# Patient Record
Sex: FEMALE | Race: WHITE | Employment: OTHER | URBAN - METROPOLITAN AREA
[De-identification: names, ages, dates, MRNs, and addresses within clinical notes are randomized per-mention and may not be internally consistent; named-entity substitution may affect disease eponyms.]

---

## 2017-01-16 ENCOUNTER — HOSPITAL ENCOUNTER (OUTPATIENT)
Dept: LAB | Age: 82
Discharge: HOME | End: 2017-01-16
Attending: INTERNAL MEDICINE
Payer: MEDICARE

## 2017-01-16 DIAGNOSIS — I27.20 PULMONARY HYPERTENSION (HCC): ICD-10-CM

## 2017-01-16 DIAGNOSIS — R00.1 BRADYCARDIA: ICD-10-CM

## 2017-01-16 LAB
ALBUMIN SERPL BCP-MCNC: 4.2 G/DL (ref 3.2–4.6)
ALBUMIN/GLOB SERPL: 1.3 {RATIO}
ALP SERPL-CCNC: 61 U/L (ref 50–136)
ALT SERPL-CCNC: 19 U/L (ref 12–65)
ANION GAP BLD CALC-SCNC: 7 MMOL/L
AST SERPL W P-5'-P-CCNC: 25 U/L (ref 15–37)
BASOPHILS # BLD AUTO: 0 K/UL (ref 0–0.2)
BASOPHILS # BLD: 0 % (ref 0–2)
BILIRUB SERPL-MCNC: 0.8 MG/DL (ref 0.2–1.1)
BUN SERPL-MCNC: 29 MG/DL (ref 8–23)
CALCIUM SERPL-MCNC: 10 MG/DL (ref 8.3–10.4)
CHLORIDE SERPL-SCNC: 98 MMOL/L (ref 98–107)
CO2 SERPL-SCNC: 33 MMOL/L (ref 23–32)
CREAT SERPL-MCNC: 1.1 MG/DL (ref 0.6–1)
DIFFERENTIAL METHOD BLD: ABNORMAL
EOSINOPHIL # BLD: 0.1 K/UL (ref 0–0.8)
EOSINOPHIL NFR BLD: 2 % (ref 0.5–7.8)
ERYTHROCYTE [DISTWIDTH] IN BLOOD BY AUTOMATED COUNT: 13.2 % (ref 11.9–14.6)
GLOBULIN SER CALC-MCNC: 3.2 G/DL
GLUCOSE SERPL-MCNC: 110 MG/DL (ref 65–100)
HCT VFR BLD AUTO: 39.4 % (ref 35.8–46.3)
HGB BLD-MCNC: 13.4 G/DL (ref 11.7–15.4)
LYMPHOCYTES # BLD AUTO: 30 % (ref 13–44)
LYMPHOCYTES # BLD: 2.3 K/UL (ref 0.5–4.6)
MAGNESIUM SERPL-MCNC: 2.2 MG/DL (ref 1.8–2.4)
MCH RBC QN AUTO: 30.7 PG (ref 26.1–32.9)
MCHC RBC AUTO-ENTMCNC: 34 G/DL (ref 31.4–35)
MCV RBC AUTO: 90.2 FL (ref 79.6–97.8)
MONOCYTES # BLD: 0.5 K/UL (ref 0.1–1.3)
MONOCYTES NFR BLD AUTO: 7 % (ref 4–12)
NEUTS SEG # BLD: 4.7 K/UL (ref 1.7–8.2)
NEUTS SEG NFR BLD AUTO: 61 % (ref 43–78)
PLATELET # BLD AUTO: 260 K/UL (ref 150–450)
PMV BLD AUTO: 9.9 FL (ref 10.8–14.1)
POTASSIUM SERPL-SCNC: 4.1 MMOL/L (ref 3.5–5.1)
PROT SERPL-MCNC: 7.4 G/DL (ref 6.3–8.2)
RBC # BLD AUTO: 4.37 M/UL (ref 4.05–5.25)
SODIUM SERPL-SCNC: 138 MMOL/L (ref 136–145)
WBC # BLD AUTO: 7.6 K/UL (ref 4.3–11.1)

## 2017-01-16 PROCEDURE — 36415 COLL VENOUS BLD VENIPUNCTURE: CPT | Performed by: INTERNAL MEDICINE

## 2017-01-16 PROCEDURE — 80053 COMPREHEN METABOLIC PANEL: CPT | Performed by: INTERNAL MEDICINE

## 2017-01-16 PROCEDURE — 85025 COMPLETE CBC W/AUTO DIFF WBC: CPT | Performed by: INTERNAL MEDICINE

## 2017-01-16 PROCEDURE — 83735 ASSAY OF MAGNESIUM: CPT | Performed by: INTERNAL MEDICINE

## 2017-01-18 ENCOUNTER — ANESTHESIA EVENT (OUTPATIENT)
Dept: SURGERY | Age: 82
End: 2017-01-18
Payer: MEDICARE

## 2017-01-18 NOTE — PROGRESS NOTES
Patient pre-assessment complete for Pacemaker with Dr Galvez Going scheduled for 17 at 1:00am, arrival time 11am. Patient verified using . Patient instructed to bring all home medications in labeled bottles on the day of procedure. NPO status reinforced. Patient instructed to HOLD HCTZ. Instructed they can take all other medications excluding vitamins & supplements. Patient verbalizes understanding of all instructions & denies any questions at this time.

## 2017-01-19 ENCOUNTER — HOSPITAL ENCOUNTER (OUTPATIENT)
Age: 82
Setting detail: OBSERVATION
Discharge: HOME | End: 2017-01-20
Attending: INTERNAL MEDICINE | Admitting: INTERNAL MEDICINE
Payer: MEDICARE

## 2017-01-19 ENCOUNTER — SURGERY (OUTPATIENT)
Age: 82
End: 2017-01-19

## 2017-01-19 ENCOUNTER — APPOINTMENT (OUTPATIENT)
Dept: CARDIAC CATH/INVASIVE PROCEDURES | Age: 82
End: 2017-01-19
Payer: MEDICARE

## 2017-01-19 ENCOUNTER — ANESTHESIA (OUTPATIENT)
Dept: SURGERY | Age: 82
End: 2017-01-19
Payer: MEDICARE

## 2017-01-19 DIAGNOSIS — R00.1 BRADYCARDIA: Primary | ICD-10-CM

## 2017-01-19 PROBLEM — Z95.0 PACEMAKER: Status: ACTIVE | Noted: 2017-01-19

## 2017-01-19 LAB
ANION GAP BLD CALC-SCNC: 7 MMOL/L (ref 7–16)
ATRIAL RATE: 50 BPM
BUN SERPL-MCNC: 23 MG/DL (ref 8–23)
CALCIUM SERPL-MCNC: 9.3 MG/DL (ref 8.3–10.4)
CALCULATED P AXIS, ECG09: 55 DEGREES
CALCULATED R AXIS, ECG10: -31 DEGREES
CALCULATED T AXIS, ECG11: 72 DEGREES
CHLORIDE SERPL-SCNC: 101 MMOL/L (ref 98–107)
CO2 SERPL-SCNC: 32 MMOL/L (ref 21–32)
CREAT SERPL-MCNC: 1.21 MG/DL (ref 0.6–1)
DIAGNOSIS, 93000: NORMAL
DIASTOLIC BP, ECG02: NORMAL MMHG
ERYTHROCYTE [DISTWIDTH] IN BLOOD BY AUTOMATED COUNT: 13.4 % (ref 11.9–14.6)
GLUCOSE SERPL-MCNC: 97 MG/DL (ref 65–100)
HCT VFR BLD AUTO: 38.3 % (ref 35.8–46.3)
HGB BLD-MCNC: 12.9 G/DL (ref 11.7–15.4)
INR PPP: 1 (ref 0.9–1.2)
MAGNESIUM SERPL-MCNC: 2.2 MG/DL (ref 1.8–2.4)
MCH RBC QN AUTO: 30.1 PG (ref 26.1–32.9)
MCHC RBC AUTO-ENTMCNC: 33.7 G/DL (ref 31.4–35)
MCV RBC AUTO: 89.3 FL (ref 79.6–97.8)
P-R INTERVAL, ECG05: 172 MS
PLATELET # BLD AUTO: 254 K/UL (ref 150–450)
PMV BLD AUTO: 10.5 FL (ref 10.8–14.1)
POTASSIUM SERPL-SCNC: 3.3 MMOL/L (ref 3.5–5.1)
PROTHROMBIN TIME: 10.7 SEC (ref 9.6–12)
Q-T INTERVAL, ECG07: 472 MS
QRS DURATION, ECG06: 82 MS
QTC CALCULATION (BEZET), ECG08: 479 MS
RBC # BLD AUTO: 4.29 M/UL (ref 4.05–5.25)
SODIUM SERPL-SCNC: 140 MMOL/L (ref 136–145)
SYSTOLIC BP, ECG01: NORMAL MMHG
VENTRICULAR RATE, ECG03: 62 BPM
WBC # BLD AUTO: 6.3 K/UL (ref 4.3–11.1)

## 2017-01-19 PROCEDURE — 99218 HC RM OBSERVATION: CPT

## 2017-01-19 PROCEDURE — 74011250636 HC RX REV CODE- 250/636

## 2017-01-19 PROCEDURE — 76060000033 HC ANESTHESIA 1 TO 1.5 HR: Performed by: INTERNAL MEDICINE

## 2017-01-19 PROCEDURE — 85610 PROTHROMBIN TIME: CPT | Performed by: INTERNAL MEDICINE

## 2017-01-19 PROCEDURE — 85027 COMPLETE CBC AUTOMATED: CPT | Performed by: INTERNAL MEDICINE

## 2017-01-19 PROCEDURE — 74011000250 HC RX REV CODE- 250

## 2017-01-19 PROCEDURE — C1898 LEAD, PMKR, OTHER THAN TRANS: HCPCS

## 2017-01-19 PROCEDURE — C1892 INTRO/SHEATH,FIXED,PEEL-AWAY: HCPCS

## 2017-01-19 PROCEDURE — 77030008467 HC STPLR SKN COVD -B

## 2017-01-19 PROCEDURE — 77030031139 HC SUT VCRL2 J&J -A

## 2017-01-19 PROCEDURE — 74011000272 HC RX REV CODE- 272: Performed by: INTERNAL MEDICINE

## 2017-01-19 PROCEDURE — 74011250636 HC RX REV CODE- 250/636: Performed by: INTERNAL MEDICINE

## 2017-01-19 PROCEDURE — L3670 SO ACRO/CLAV CAN WEB PRE OTS: HCPCS

## 2017-01-19 PROCEDURE — 77030018579 HC SUT TICRN1 COVD -B

## 2017-01-19 PROCEDURE — 83735 ASSAY OF MAGNESIUM: CPT | Performed by: INTERNAL MEDICINE

## 2017-01-19 PROCEDURE — 33208 INSRT HEART PM ATRIAL & VENT: CPT

## 2017-01-19 PROCEDURE — 93005 ELECTROCARDIOGRAM TRACING: CPT | Performed by: INTERNAL MEDICINE

## 2017-01-19 PROCEDURE — C1785 PMKR, DUAL, RATE-RESP: HCPCS

## 2017-01-19 PROCEDURE — 74011250637 HC RX REV CODE- 250/637: Performed by: INTERNAL MEDICINE

## 2017-01-19 PROCEDURE — 80048 BASIC METABOLIC PNL TOTAL CA: CPT | Performed by: INTERNAL MEDICINE

## 2017-01-19 PROCEDURE — 74011000250 HC RX REV CODE- 250: Performed by: INTERNAL MEDICINE

## 2017-01-19 PROCEDURE — 77030012935 HC DRSG AQUACEL BMS -B

## 2017-01-19 RX ORDER — LORAZEPAM 0.5 MG/1
0.5 TABLET ORAL
Status: DISCONTINUED | OUTPATIENT
Start: 2017-01-19 | End: 2017-01-20 | Stop reason: HOSPADM

## 2017-01-19 RX ORDER — POTASSIUM CHLORIDE 14.9 MG/ML
20 INJECTION INTRAVENOUS ONCE
Status: COMPLETED | OUTPATIENT
Start: 2017-01-19 | End: 2017-01-19

## 2017-01-19 RX ORDER — SODIUM CHLORIDE, SODIUM LACTATE, POTASSIUM CHLORIDE, CALCIUM CHLORIDE 600; 310; 30; 20 MG/100ML; MG/100ML; MG/100ML; MG/100ML
100 INJECTION, SOLUTION INTRAVENOUS CONTINUOUS
Status: DISCONTINUED | OUTPATIENT
Start: 2017-01-19 | End: 2017-01-19

## 2017-01-19 RX ORDER — AMLODIPINE BESYLATE 5 MG/1
5 TABLET ORAL DAILY
COMMUNITY
End: 2017-01-20

## 2017-01-19 RX ORDER — DIPHENHYDRAMINE HYDROCHLORIDE 50 MG/ML
12.5 INJECTION, SOLUTION INTRAMUSCULAR; INTRAVENOUS ONCE
Status: DISCONTINUED | OUTPATIENT
Start: 2017-01-19 | End: 2017-01-19

## 2017-01-19 RX ORDER — CEPHALEXIN 500 MG/1
500 CAPSULE ORAL 3 TIMES DAILY
Status: DISCONTINUED | OUTPATIENT
Start: 2017-01-19 | End: 2017-01-20 | Stop reason: HOSPADM

## 2017-01-19 RX ORDER — DIPHENHYDRAMINE HYDROCHLORIDE 50 MG/ML
50 INJECTION, SOLUTION INTRAMUSCULAR; INTRAVENOUS ONCE
Status: COMPLETED | OUTPATIENT
Start: 2017-01-19 | End: 2017-01-19

## 2017-01-19 RX ORDER — BUPIVACAINE HYDROCHLORIDE AND EPINEPHRINE 5; 5 MG/ML; UG/ML
60 INJECTION, SOLUTION EPIDURAL; INTRACAUDAL; PERINEURAL ONCE
Status: COMPLETED | OUTPATIENT
Start: 2017-01-19 | End: 2017-01-19

## 2017-01-19 RX ORDER — SODIUM CHLORIDE 0.9 % (FLUSH) 0.9 %
5-10 SYRINGE (ML) INJECTION AS NEEDED
Status: DISCONTINUED | OUTPATIENT
Start: 2017-01-19 | End: 2017-01-20 | Stop reason: ALTCHOICE

## 2017-01-19 RX ORDER — OXYCODONE AND ACETAMINOPHEN 5; 325 MG/1; MG/1
1 TABLET ORAL
Status: DISCONTINUED | OUTPATIENT
Start: 2017-01-19 | End: 2017-01-20 | Stop reason: HOSPADM

## 2017-01-19 RX ORDER — CARVEDILOL 3.12 MG/1
3.12 TABLET ORAL 2 TIMES DAILY WITH MEALS
COMMUNITY
End: 2017-01-20

## 2017-01-19 RX ORDER — SODIUM CHLORIDE 0.9 % (FLUSH) 0.9 %
5-10 SYRINGE (ML) INJECTION EVERY 8 HOURS
Status: DISCONTINUED | OUTPATIENT
Start: 2017-01-19 | End: 2017-01-19

## 2017-01-19 RX ORDER — OXYCODONE AND ACETAMINOPHEN 5; 325 MG/1; MG/1
1 TABLET ORAL AS NEEDED
Status: DISCONTINUED | OUTPATIENT
Start: 2017-01-19 | End: 2017-01-20 | Stop reason: ALTCHOICE

## 2017-01-19 RX ORDER — SODIUM CHLORIDE 9 MG/ML
50 INJECTION, SOLUTION INTRAVENOUS CONTINUOUS
Status: DISCONTINUED | OUTPATIENT
Start: 2017-01-19 | End: 2017-01-19

## 2017-01-19 RX ORDER — MIDAZOLAM HYDROCHLORIDE 1 MG/ML
2 INJECTION, SOLUTION INTRAMUSCULAR; INTRAVENOUS
Status: DISCONTINUED | OUTPATIENT
Start: 2017-01-19 | End: 2017-01-20 | Stop reason: ALTCHOICE

## 2017-01-19 RX ORDER — MIDAZOLAM HYDROCHLORIDE 1 MG/ML
2 INJECTION, SOLUTION INTRAMUSCULAR; INTRAVENOUS ONCE
Status: DISCONTINUED | OUTPATIENT
Start: 2017-01-19 | End: 2017-01-19

## 2017-01-19 RX ORDER — HYDROCHLOROTHIAZIDE 50 MG/1
50 TABLET ORAL DAILY
Status: DISCONTINUED | OUTPATIENT
Start: 2017-01-20 | End: 2017-01-20 | Stop reason: HOSPADM

## 2017-01-19 RX ORDER — SODIUM CHLORIDE 0.9 % (FLUSH) 0.9 %
5-10 SYRINGE (ML) INJECTION EVERY 8 HOURS
Status: DISCONTINUED | OUTPATIENT
Start: 2017-01-19 | End: 2017-01-20 | Stop reason: HOSPADM

## 2017-01-19 RX ORDER — SODIUM CHLORIDE, SODIUM LACTATE, POTASSIUM CHLORIDE, CALCIUM CHLORIDE 600; 310; 30; 20 MG/100ML; MG/100ML; MG/100ML; MG/100ML
INJECTION, SOLUTION INTRAVENOUS
Status: DISCONTINUED | OUTPATIENT
Start: 2017-01-19 | End: 2017-01-19 | Stop reason: HOSPADM

## 2017-01-19 RX ORDER — CEFAZOLIN SODIUM IN 0.9 % NACL 2 G/50 ML
INTRAVENOUS SOLUTION, PIGGYBACK (ML) INTRAVENOUS AS NEEDED
Status: DISCONTINUED | OUTPATIENT
Start: 2017-01-19 | End: 2017-01-19 | Stop reason: HOSPADM

## 2017-01-19 RX ORDER — LANOLIN ALCOHOL/MO/W.PET/CERES
400 CREAM (GRAM) TOPICAL DAILY
Status: DISCONTINUED | OUTPATIENT
Start: 2017-01-20 | End: 2017-01-20 | Stop reason: HOSPADM

## 2017-01-19 RX ORDER — METOPROLOL TARTRATE 50 MG/1
50 TABLET ORAL 2 TIMES DAILY
Status: DISCONTINUED | OUTPATIENT
Start: 2017-01-19 | End: 2017-01-20 | Stop reason: HOSPADM

## 2017-01-19 RX ORDER — AMIODARONE HYDROCHLORIDE 200 MG/1
200 TABLET ORAL 2 TIMES DAILY
Status: DISCONTINUED | OUTPATIENT
Start: 2017-01-19 | End: 2017-01-20 | Stop reason: HOSPADM

## 2017-01-19 RX ORDER — PROPOFOL 10 MG/ML
INJECTION, EMULSION INTRAVENOUS
Status: DISCONTINUED | OUTPATIENT
Start: 2017-01-19 | End: 2017-01-19 | Stop reason: HOSPADM

## 2017-01-19 RX ORDER — FAMOTIDINE 20 MG/1
20 TABLET, FILM COATED ORAL ONCE
Status: DISCONTINUED | OUTPATIENT
Start: 2017-01-19 | End: 2017-01-19

## 2017-01-19 RX ORDER — PROPOFOL 10 MG/ML
INJECTION, EMULSION INTRAVENOUS AS NEEDED
Status: DISCONTINUED | OUTPATIENT
Start: 2017-01-19 | End: 2017-01-19 | Stop reason: HOSPADM

## 2017-01-19 RX ORDER — HYDROMORPHONE HYDROCHLORIDE 2 MG/ML
0.5 INJECTION, SOLUTION INTRAMUSCULAR; INTRAVENOUS; SUBCUTANEOUS
Status: DISCONTINUED | OUTPATIENT
Start: 2017-01-19 | End: 2017-01-20 | Stop reason: ALTCHOICE

## 2017-01-19 RX ORDER — FAMOTIDINE 10 MG/ML
20 INJECTION INTRAVENOUS ONCE
Status: COMPLETED | OUTPATIENT
Start: 2017-01-19 | End: 2017-01-19

## 2017-01-19 RX ORDER — LEVOTHYROXINE SODIUM 50 UG/1
50 TABLET ORAL
Status: DISCONTINUED | OUTPATIENT
Start: 2017-01-20 | End: 2017-01-20 | Stop reason: HOSPADM

## 2017-01-19 RX ORDER — LIDOCAINE HYDROCHLORIDE 20 MG/ML
INJECTION, SOLUTION EPIDURAL; INFILTRATION; INTRACAUDAL; PERINEURAL AS NEEDED
Status: DISCONTINUED | OUTPATIENT
Start: 2017-01-19 | End: 2017-01-19 | Stop reason: HOSPADM

## 2017-01-19 RX ORDER — AMIODARONE HYDROCHLORIDE 150 MG/3ML
INJECTION, SOLUTION INTRAVENOUS AS NEEDED
Status: DISCONTINUED | OUTPATIENT
Start: 2017-01-19 | End: 2017-01-19 | Stop reason: HOSPADM

## 2017-01-19 RX ORDER — SODIUM CHLORIDE 0.9 % (FLUSH) 0.9 %
5-10 SYRINGE (ML) INJECTION AS NEEDED
Status: DISCONTINUED | OUTPATIENT
Start: 2017-01-19 | End: 2017-01-20 | Stop reason: HOSPADM

## 2017-01-19 RX ORDER — FENTANYL CITRATE 50 UG/ML
25 INJECTION, SOLUTION INTRAMUSCULAR; INTRAVENOUS ONCE
Status: DISCONTINUED | OUTPATIENT
Start: 2017-01-19 | End: 2017-01-19

## 2017-01-19 RX ORDER — OXYCODONE HYDROCHLORIDE 5 MG/1
5 TABLET ORAL
Status: DISCONTINUED | OUTPATIENT
Start: 2017-01-19 | End: 2017-01-20 | Stop reason: ALTCHOICE

## 2017-01-19 RX ORDER — LIDOCAINE HYDROCHLORIDE 10 MG/ML
0.1 INJECTION INFILTRATION; PERINEURAL AS NEEDED
Status: DISCONTINUED | OUTPATIENT
Start: 2017-01-19 | End: 2017-01-20 | Stop reason: ALTCHOICE

## 2017-01-19 RX ADMIN — AMIODARONE HYDROCHLORIDE 25 MG: 150 INJECTION, SOLUTION INTRAVENOUS at 16:33

## 2017-01-19 RX ADMIN — BUPIVACAINE HYDROCHLORIDE AND EPINEPHRINE 300 MG: 5; 5 INJECTION, SOLUTION EPIDURAL; INTRACAUDAL; PERINEURAL at 13:00

## 2017-01-19 RX ADMIN — DIPHENHYDRAMINE HYDROCHLORIDE 50 MG: 50 INJECTION, SOLUTION INTRAMUSCULAR; INTRAVENOUS at 15:20

## 2017-01-19 RX ADMIN — PROPOFOL 120 MCG/KG/MIN: 10 INJECTION, EMULSION INTRAVENOUS at 16:05

## 2017-01-19 RX ADMIN — Medication 5 ML: at 21:37

## 2017-01-19 RX ADMIN — SODIUM CHLORIDE, SODIUM LACTATE, POTASSIUM CHLORIDE, CALCIUM CHLORIDE: 600; 310; 30; 20 INJECTION, SOLUTION INTRAVENOUS at 15:57

## 2017-01-19 RX ADMIN — FAMOTIDINE 20 MG: 10 INJECTION, SOLUTION INTRAVENOUS at 15:20

## 2017-01-19 RX ADMIN — LIDOCAINE HYDROCHLORIDE 40 MG: 20 INJECTION, SOLUTION EPIDURAL; INFILTRATION; INTRACAUDAL; PERINEURAL at 16:03

## 2017-01-19 RX ADMIN — POTASSIUM CHLORIDE 20 MEQ: 14.9 INJECTION, SOLUTION INTRAVENOUS at 15:10

## 2017-01-19 RX ADMIN — PROPOFOL 40 MG: 10 INJECTION, EMULSION INTRAVENOUS at 16:03

## 2017-01-19 RX ADMIN — AMIODARONE HYDROCHLORIDE 25 MG: 150 INJECTION, SOLUTION INTRAVENOUS at 16:40

## 2017-01-19 RX ADMIN — AMIODARONE HYDROCHLORIDE 25 MG: 150 INJECTION, SOLUTION INTRAVENOUS at 16:39

## 2017-01-19 RX ADMIN — AMIODARONE HYDROCHLORIDE 200 MG: 200 TABLET ORAL at 20:49

## 2017-01-19 RX ADMIN — AMIODARONE HYDROCHLORIDE 25 MG: 150 INJECTION, SOLUTION INTRAVENOUS at 16:35

## 2017-01-19 RX ADMIN — CEPHALEXIN 500 MG: 500 CAPSULE ORAL at 21:36

## 2017-01-19 RX ADMIN — Medication 2 G: at 15:57

## 2017-01-19 RX ADMIN — AMIODARONE HYDROCHLORIDE 25 MG: 150 INJECTION, SOLUTION INTRAVENOUS at 16:34

## 2017-01-19 RX ADMIN — METOPROLOL TARTRATE 50 MG: 50 TABLET ORAL at 20:49

## 2017-01-19 RX ADMIN — NEOMYCIN AND POLYMYXIN B SULFATES: 40; 200000 IRRIGANT IRRIGATION at 13:00

## 2017-01-19 RX ADMIN — AMIODARONE HYDROCHLORIDE 25 MG: 150 INJECTION, SOLUTION INTRAVENOUS at 16:41

## 2017-01-19 NOTE — ANESTHESIA POSTPROCEDURE EVALUATION
Post-Anesthesia Evaluation and Assessment    Patient: Thania Brownlee MRN: [id number]  SSN: [id number]    YOB: 1926  Age: 80 y.o. Sex: female       Cardiovascular Function/Vital Signs  Visit Vitals    /69    Pulse 82    Temp 36.4 °C (97.5 °F)    Resp 14    Ht 5' 4.5\" (1.638 m)    Wt 52.2 kg (115 lb)    SpO2 98%    Breastfeeding No    BMI 19.43 kg/m2       Patient is status post total IV anesthesia anesthesia for Procedure(s):  DUAL CHAMBER PACEMAKER. Nausea/Vomiting: None    Postoperative hydration reviewed and adequate. Pain:  Pain Scale 1: Numeric (0 - 10) (01/19/17 1735)  Pain Intensity 1: 0 (01/19/17 1735)   Managed    Neurological Status: At baseline    Mental Status and Level of Consciousness: Arousable    Pulmonary Status:   O2 Device: Room air (01/19/17 1710)   Adequate oxygenation and airway patent    Complications related to anesthesia: None    Post-anesthesia assessment completed.  No concerns    Signed By: Rakel Quintero MD     January 19, 2017

## 2017-01-19 NOTE — IP AVS SNAPSHOT
Onofre Victoria 
 
 
 [address] [facility] [address] 
[phone] Patient: Yris Moore MRN: [id number] :3/11/1926 You are allergic to the following Allergen Reactions Demerol (Meperidine) Nausea and Vomiting Morphine Sulfate Nausea and Vomiting  
    
 Sulfa (Sulfonamide Antibiotics) Rash Darvon (Propoxyphene) Unknown (comments) Unknown reaction Pcn (Penicillins) Rash Recent Documentation Height Weight Breastfeeding? 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 [address] [place] [address] 
[phone] Jose De Jesus Keating MD On 2/3/2017 Friday, Feb 3rd at 10:30am-- [place] office [address] [facility] [address] 
[phone] Your Appointments  2017 10:30 AM EST  
OFFICE DEVICE CHECKS with [employer] DEVICE 39  
 [facility] ([facility]) [address] 
[address] [place] 81  
[phone] Current Discharge Medication List  
  
START taking these medications Dose & Instructions Dispensing Information Comments Morning Noon Evening Bedtime  
 amiodarone 200 mg tablet Commonly known as:  CORDARONE Your next dose is:  Tomorrow Dose:  200 mg Take 1 Tab by mouth daily. Quantity:  30 Tab Refills:  11  
     
  
   
   
   
  
 cephALEXin 500 mg capsule Commonly known as:  Nash Panning Your next dose is: Today Dose:  500 mg Take 1 Cap by mouth three (3) times daily. Quantity:  10 Cap Refills:  0  
     
  
   
  
   
   
  
  
 metoprolol tartrate 50 mg tablet Commonly known as:  LOPRESSOR Your next dose is: Today Dose:  50 mg Take 1 Tab by mouth two (2) times a day. Quantity:  60 Tab Refills:  11  
     
  
   
   
   
  
  
 oxyCODONE-acetaminophen 5-325 mg per tablet Commonly known as:  PERCOCET Notes to Patient:  AS NEEDED Dose:  1 Tab Take 1 Tab by mouth every four (4) hours as needed. Max Daily Amount: 6 Tabs. Quantity:  10 Tab Refills:  0  
     
   
   
   
  
 potassium chloride SR 10 mEq tablet Commonly known as:  KLOR-CON 10 Your next dose is:  Tomorrow Dose:  10 mEq Take 1 Tab by mouth daily. Quantity:  30 Tab Refills:  11 CONTINUE these medications which have NOT CHANGED Dose & Instructions Dispensing Information Comments Morning Noon Evening Bedtime  
 cholecalciferol 1,000 unit tablet Commonly known as:  VITAMIN D3 Your next dose is:  Tomorrow Take  by mouth daily. Refills:  0  
     
  
   
   
   
  
 hydroCHLOROthiazide 50 mg tablet Commonly known as:  HYDRODIURIL Your next dose is:  Tomorrow 1 po qd Quantity:  90 Tab Refills:  1  
     
  
   
   
   
  
 levothyroxine 50 mcg tablet Commonly known as:  SYNTHROID Your next dose is:  Tomorrow 1 po qd Quantity:  90 Tab Refills:  3 LORazepam 0.5 mg tablet Commonly known as:  ATIVAN Notes to Patient:  AS NEEDED  
   
 1 po every day prn Quantity:  90 Tab Refills:  1  
     
   
   
   
  
 magnesium oxide 400 mg tablet Commonly known as:  MAG-OX Your next dose is:  Tomorrow Dose:  400 mg Take 400 mg by mouth daily. Refills:  0  
     
  
   
   
   
  
 neomycin-polymyxin-hydrocortisone (buffered) 3.5-10,000-1 mg/mL-unit/mL-% otic suspension Commonly known as:  Lodema Eaton Your next dose is: Today Dose:  3 Drop Administer 3 Drops into each ear four (4) times daily. Quantity:  10 mL Refills:  11  
     
  
   
  
   
  
   
  
  
 triamcinolone acetonide 0.1 % topical cream  
Commonly known as:  KENALOG Notes to Patient:  AS DIRECTED  
   
 as directed. Refills:  0 STOP taking these medications   
 amLODIPine 5 mg tablet Commonly known as:  NORVASC  
   
  
 carvedilol 3.125 mg tablet Commonly known as:  COREG  
   
  
 COREG 3.125 mg tablet Generic drug:  carvedilol Where to Get Your Medications Information on where to get these meds will be given to you by the nurse or doctor. ! Ask your nurse or doctor about these medications  
  amiodarone 200 mg tablet  
 cephALEXin 500 mg capsule  
 metoprolol tartrate 50 mg tablet  
 oxyCODONE-acetaminophen 5-325 mg per tablet  
 potassium chloride SR 10 mEq tablet Discharge Instructions Pacemaker Discharge Instructions *Check the incision site frequently for swelling or bleeding. If you see any bleeding, lie down and apply pressure over the area with a clean town or washcloth. Notify your doctor for any redness, swelling, drainage or oozing from the puncture site. Notify your doctor for any fever or chills. *Any signs of infection including increased redness, suspicious drainage, or unexplained fever should be reported to the doctor immediately by calling [address] [place] [address] [address] [facility]. *Until cleared by the MD-- No raising the affected limb above 90 degrees (above the shoulders). No lifting with that arm. No driving. *You should sleep in the sling. *You may resume your usual diet. Drink more fluids than usual. 
 
*Have a responsible person drive you home and stay with you for at least 24 hours after your procedure. *Leave the dressing to shoulder in place. The MD will remove it at your follow-up appointment. You may shower in 2 days (Sunday). Until cleared by MD, no tub baths, hot tubs or swimming. Pacemaker Placement: What to Expect at [facility] Your Recovery Pacemaker placement is surgery to put a pacemaker in your chest when you have bradycardia (a slow heart rate). A pacemaker is a small, battery-powered device that sends electrical signals to the heart to keep the heartbeat steady. Thin wires, called leads, carry the signals from the pacemaker to the heart. A pacemaker can prevent or reduce dizziness, fainting, and shortness of breath caused by a slow or unsteady heartbeat. Your chest may be sore where the doctor made the cut (incision) and put in the pacemaker. You also may have a bruise and mild swelling. These symptoms usually get better in 1 to 2 weeks. You may feel a hard ridge along the incision. This usually gets softer in the months after surgery. You may be able to see or feel the outline of the pacemaker under your skin. You will probably be able to go back to work or your usual routine 1 to 2 weeks after surgery. Pacemaker batteries usually last 5 to 15 years. Your doctor will talk to you about how often you will need to have your pacemaker checked. You can safely use most household and office electronics such as kitchen appliances, electric power tools, and computers.  You will need to stay away from things with strong magnetic and electrical fields such as an MRI machine (unless your pacemaker is safe for an MRI), welding equipment, and power generators. You can use a cell phone, but keep it at least 6 inches away from your pacemaker. Check with your doctor about what you need to stay away from, what you need to use with care, and what is okay to use. This care sheet gives you a general idea about how long it will take for you to recover. But each person recovers at a different pace. Follow the steps below to get better as quickly as possible. How can you care for yourself at home? Activity · Rest when you feel tired. · Be active. Walking is a good choice. · For 4 to 6 weeks: ¨ Avoid activities that strain your chest or upper arm muscles. This includes pushing a  or vacuum, mopping floors, swimming, or swinging a golf club or tennis racquet. ¨ Do not raise your arm (the one on the side of your body where the pacemaker is located) above your shoulder. ¨ Allow your body to heal. Don't move quickly or lift anything heavy until you are feeling better. · Many people are able to return to work within 1 to 2 weeks after surgery. · Ask your doctor when it is okay for you to have sex. Diet · You can eat your normal diet. If your stomach is upset, try bland, low-fat foods like plain rice, broiled chicken, toast, and yogurt. Medicines · Your doctor will tell you if and when you can restart your medicines. He or she will also give you instructions about taking any new medicines. · If you take aspirin or some other blood thinner, be sure to talk to your doctor. He or she will tell you if and when to start taking this medicine again. Make sure that you understand exactly what your doctor wants you to do. · Be safe with medicines. Read and follow all instructions on the label. ¨ If the doctor gave you a prescription medicine for pain, take it as prescribed. ¨ If you are not taking a prescription pain medicine, ask your doctor if you can take an over-the-counter medicine. ¨ Do not take aspirin, ibuprofen (Advil, Motrin), naproxen (Aleve), or other nonsteroidal anti-inflammatory drugs (NSAIDs) unless your doctor says it is okay. · If your doctor prescribed antibiotics, take them as directed. Do not stop taking them just because you feel better. You need to take the full course of antibiotics. Incision care · If you have strips of tape on the incision, leave the tape on for a week or until it falls off. · Keep the incision dry while it heals. Your doctor may recommend sponge baths for about 7 days, but do not get the incision wet. Your doctor will let you know when you may take showers. After a shower, pat the incision dry. · Don't use hydrogen peroxide or alcohol on the incision, which can slow healing. You may cover the area with a gauze bandage if it oozes fluid or rubs against clothing. Change the bandage every day. · Do not take a bath or get into a hot tub for the first 2 weeks, or until your doctor tells you it is okay. Other instructions · Keep a medical ID card with you at all times that says you have a pacemaker. The card should include the  and model information. · Wear medical alert jewelry stating that you have a pacemaker. You can buy this at most drugstores. · Check your pulse as directed by your doctor. · Have your pacemaker checked as often as your doctor recommends. In some cases, this may be done over the phone or the Internet. Your doctor will give you instructions about how to do this. Follow-up care is a key part of your treatment and safety. Be sure to make and go to all appointments, and call your doctor if you are having problems. It's also a good idea to know your test results and keep a list of the medicines you take. When should you call for help? Call 911 anytime you think you may need emergency care. For example, call if: 
· You passed out (lost consciousness). · You have severe trouble breathing. · You have sudden chest pain and shortness of breath, or you cough up blood. · You have symptoms of a heart attack. These may include: ¨ Chest pain or pressure, or a strange feeling in the chest. 
¨ Sweating. ¨ Shortness of breath. ¨ Nausea or vomiting. ¨ Pain, pressure, or a strange feeling in the back, neck, jaw, or upper belly or in one or both shoulders or arms. ¨ Lightheadedness or sudden weakness. ¨ A fast or irregular heartbeat. After you call 911, the  may tell you to chew 1 adult-strength or 2 to 4 low-dose aspirin. Wait for an ambulance. Do not try to drive yourself. · You have symptoms of a stroke. These may include: 
¨ Sudden numbness, tingling, weakness, or loss of movement in your face, arm, or leg, especially on only one side of your body. ¨ Sudden vision changes. ¨ Sudden trouble speaking. ¨ Sudden confusion or trouble understanding simple statements. ¨ Sudden problems with walking or balance. ¨ A sudden, severe headache that is different from past headaches. Call your doctor now or seek immediate medical care if: 
· Your heartbeat feels very fast or slow, skips beats, or flutters. · You are dizzy or lightheaded, or you feel like you may faint. · You have new or increased shortness of breath. · You have pain that does not get better after you take pain medicine. · You have loose stitches, or your incision comes open. · Bright red blood has soaked through the bandage over your incision. · You have signs of infection, such as: 
¨ Increased pain, swelling, warmth, or redness. ¨ Red streaks leading from the incision. ¨ Pus draining from the incision. ¨ A fever. · You have signs of a blood clot, such as: 
¨ Pain in your calf, back of the knee, thigh, or groin. ¨ Redness and swelling in your leg or groin. Watch closely for changes in your health, and be sure to contact your doctor if: 
· You have any problems with your pacemaker. Where can you learn more? Go to http://maura-zhneg.info/. Enter G550 in the search box to learn more about \"Pacemaker Placement: What to Expect at Home. \" Current as of: August 4, 2016 Content Version: 11.1 © 5287-1088 [employer]. Care instructions adapted under license by [employer] (which disclaims liability or warranty for this information). If you have questions about a medical condition or this instruction, always ask your healthcare professional. [place] [address] any warranty or liability for your use of this information. Learning About Rhythm-Control Medicines Introduction Rhythm-control medicines return your heart to a normal rhythm. And they keep it at a normal rhythm. They are also called antiarrhythmics. Doctors may use these medicines if: 
· You have bad symptoms. · You had electric cardioversion. Or you will have it. · You tried medicine to control your heart rate. But it did not help. These medicines can have serious side effects. Examples · Amiodarone (Cordarone, Pacerone) · Dofetilide (Tikosyn) · Propafenone (Rythmol) · Sotalol (Betapace AF) Possible side effects Side effects depend on which medicine you take. One serious one is a very irregular heart rate. Read the information that comes with your medicine. What to know about taking this medicine · You may be in the hospital when you start this medicine. Your doctor will watch what it does to your heart. · Be sure to ask your doctor any questions. You may want to know more about the pros and cons of the medicine. · Your doctor will check you often. He or she will make sure you are not having problems. Be sure to go to all of your visits. · You may need regular blood tests. These make sure you are taking the right amount of medicine. · Take your medicines exactly as prescribed. Call your doctor if you think you are having a problem with your medicine. · Check with your doctor or pharmacist before you use any other medicines. This includes over-the-counter medicines. Make sure your doctor knows all of the medicines, vitamins, herbal products, and supplements you take. Taking some medicines together can cause problems. Where can you learn more? Go to http://maura-zheng.info/. Enter X379 in the search box to learn more about \"Learning About Rhythm-Control Medicines. \" Current as of: January 27, 2016 Content Version: 11.1 © 2944-9266 [employer]at. Care instructions adapted under license by [employer] (which disclaims liability or warranty for this information). If you have questions about a medical condition or this instruction, always ask your healthcare professional. [address] any warranty or liability for your use of this information. Heart-Healthy Diet: Care Instructions Your Care Instructions A heart-healthy diet has lots of vegetables, fruits, nuts, beans, and whole grains, and is low in salt. It limits foods that are high in saturated fat, such as meats, cheeses, and fried foods. It may be hard to change your diet, but even small changes can lower your risk of heart attack and heart disease. Follow-up care is a key part of your treatment and safety. Be sure to make and go to all appointments, and call your doctor if you are having problems. It's also a good idea to know your test results and keep a list of the medicines you take. How can you care for yourself at home? Watch your portions · Learn what a serving is. A \"serving\" and a \"portion\" are not always the same thing. Make sure that you are not eating larger portions than are recommended. For example, a serving of pasta is ½ cup.  A serving size of meat is 2 to 3 ounces. A 3-ounce serving is about the size of a deck of cards. Measure serving sizes until you are good at [place]" them. Keep in mind that restaurants often serve portions that are 2 or 3 times the size of one serving. · To keep your energy level up and keep you from feeling hungry, eat often but in smaller portions. · Eat only the number of calories you need to stay at a healthy weight. If you need to lose weight, eat fewer calories than your body burns (through exercise and other physical activity). Eat more fruits and vegetables · Eat a variety of fruit and vegetables every day. Dark green, deep orange, red, or yellow fruits and vegetables are especially good for you. Examples include spinach, carrots, peaches, and berries. · Keep carrots, celery, and other veggies handy for snacks. Buy fruit that is in season and store it where you can see it so that you will be tempted to eat it. · Cook dishes that have a lot of veggies in them, such as stir-fries and soups. Limit saturated and trans fat · Read food labels, and try to avoid saturated and trans fats. They increase your risk of heart disease. Trans fat is found in many processed foods such as cookies and crackers. · Use olive or canola oil when you cook. Try cholesterol-lowering spreads, such as Benecol or Take Control. · Bake, broil, grill, or steam foods instead of frying them. · Choose lean meats instead of high-fat meats such as hot dogs and sausages. Cut off all visible fat when you prepare meat. · Eat fish, skinless poultry, and meat alternatives such as soy products instead of high-fat meats. Soy products, such as tofu, may be especially good for your heart. · Choose low-fat or fat-free milk and dairy products. Eat fish · Eat at least two servings of fish a week. Certain fish, such as salmon and tuna, contain omega-3 fatty acids, which may help reduce your risk of heart attack. Eat foods high in fiber · Eat a variety of grain products every day. Include whole-grain foods that have lots of fiber and nutrients. Examples of whole-grain foods include oats, whole wheat bread, and brown rice. · Buy whole-grain breads and cereals, instead of white bread or pastries. Limit salt and sodium · Limit how much salt and sodium you eat to help lower your blood pressure. · Taste food before you salt it. Add only a little salt when you think you need it. With time, your taste buds will adjust to less salt. · Eat fewer snack items, fast foods, and other high-salt, processed foods. Check food labels for the amount of sodium in packaged foods. · Choose low-sodium versions of canned goods (such as soups, vegetables, and beans). Limit sugar · Limit drinks and foods with added sugar. These include candy, desserts, and soda pop. Limit alcohol · Limit alcohol to no more than 2 drinks a day for men and 1 drink a day for women. Too much alcohol can cause health problems. When should you call for help? Watch closely for changes in your health, and be sure to contact your doctor if: 
· You would like help planning heart-healthy meals. Where can you learn more? Go to http://maura-zheng.info/. Enter V137 in the search box to learn more about \"Heart-Healthy Diet: Care Instructions. \" Current as of: January 27, 2016 Content Version: 11.1 © 8566-2959 [employer]. Care instructions adapted under license by [employer] (which disclaims liability or warranty for this information). If you have questions about a medical condition or this instruction, always ask your healthcare professional. [place] [address] any warranty or liability for your use of this information. DISCHARGE SUMMARY from Nurse The following personal items are in your possession at time of discharge: 
 
Dental Appliances: Partials, With patient Visual Aid: Glasses Hearing Aids/Status: Bilateral 
 Home Medications: Sent to pharmacy Jewelry: None Clothing: At bedside Other Valuables: Clarissa Jayden PATIENT INSTRUCTIONS: 
 
After general anesthesia or intravenous sedation, for 24 hours or while taking prescription Narcotics: · Limit your activities · Do not drive and operate hazardous machinery · Do not make important personal or business decisions · Do  not drink alcoholic beverages · If you have not urinated within 8 hours after discharge, please contact your surgeon on call. Report the following to your surgeon: 
· Excessive pain, swelling, redness or odor of or around the surgical area · Temperature over 100.5 · Nausea and vomiting lasting longer than 4 hours or if unable to take medications · Any signs of decreased circulation or nerve impairment to extremity: change in color, persistent  numbness, tingling, coldness or increase pain · Any questions What to do at Home: 
Recommended activity: No lifting, Driving, or Strenuous exercise until cleared by MD 
 
If you experience any of the following symptoms or signs of infection, increased shortness of breath, or unrelieved pain, please follow up with [facility] at [phone]. *  Please give a list of your current medications to your Primary Care Provider. *  Please update this list whenever your medications are discontinued, doses are 
    changed, or new medications (including over-the-counter products) are added. *  Please carry medication information at all times in case of emergency situations. These are general instructions for a healthy lifestyle: No smoking/ No tobacco products/ Avoid exposure to second hand smoke Surgeon General's Warning:  Quitting smoking now greatly reduces serious risk to your health. Obesity, smoking, and sedentary lifestyle greatly increases your risk for illness A healthy diet, regular physical exercise & weight monitoring are important for maintaining a healthy lifestyle You may be retaining fluid if you have a history of heart failure or if you experience any of the following symptoms:  Weight gain of 3 pounds or more overnight or 5 pounds in a week, increased swelling in our hands or feet or shortness of breath while lying flat in bed. Please call your doctor as soon as you notice any of these symptoms; do not wait until your next office visit. Recognize signs and symptoms of STROKE: 
 
F-face looks uneven A-arms unable to move or move unevenly S-speech slurred or non-existent T-time-call 911 as soon as signs and symptoms begin-DO NOT go Back to bed or wait to see if you get better-TIME IS BRAIN. Warning Signs of HEART ATTACK Call 911 if you have these symptoms: 
? Chest discomfort. Most heart attacks involve discomfort in the center of the chest that lasts more than a few minutes, or that goes away and comes back. It can feel like uncomfortable pressure, squeezing, fullness, or pain. ? Discomfort in other areas of the upper body. Symptoms can include pain or discomfort in one or both arms, the back, neck, jaw, or stomach. ? Shortness of breath with or without chest discomfort. ? Other signs may include breaking out in a cold sweat, nausea, or lightheadedness. Don't wait more than five minutes to call [address]! Fast action can save your life. Calling 911 is almost always the fastest way to get lifesaving treatment. Emergency Medical Services staff can begin treatment when they arrive  up to an hour sooner than if someone gets to the hospital by car. The discharge information has been reviewed with the patient. The patient verbalized understanding. Discharge medications reviewed with the patient and appropriate educational materials and side effects teaching were provided. Discharge Orders None ACO Transitions of Care Introducing [employer] [address]i Sullivan offers a voluntary care coordination program to provide high quality service and care to [facility] fee-for-service beneficiaries. Jessica York was designed to help you enhance your health and well-being through the following services: ? Transitions of Care  support for individuals who are transitioning from one care setting to another (example: Hospital to home). ? Chronic and Complex Care Coordination  support for individuals and caregivers of those with serious or chronic illnesses or with more than one chronic (ongoing) condition and those who take a number of different medications. If you meet specific medical criteria, a [address] [place] [address] Rd may call you directly to coordinate your care with your primary care physician and your other care providers. For questions about the [facility] programs, please, contact your physicians office. For general questions or additional information about Accountable Care Organizations: 
Please visit www.medicare.gov/acos. html or call 1-800-MEDICARE (4-474.395.9033) TTY users should call 3-159.874.6473. [employer] Announcement We are excited to announce that we are making your provider's discharge notes available to you in [employer]. You will see these notes when they are completed and signed by the physician that discharged you from your recent hospital stay. If you have any questions or concerns about any information you see in [employer], please call the Health Information Department where you were seen or reach out to your Primary Care Provider for more information about your plan of care. Introducing [facility]! Berna Whelan introduces [employer] patient portal. Now you can access parts of your medical record, email your doctor's office, and request medication refills online. 1. In your internet browser, go to [email or web address] 2. Click on the First Time User? Click Here link in the Sign In box. You will see the New Member Sign Up page. 3. Enter your [facility] Access Code exactly as it appears below. You will not need to use this code after youve completed the sign-up process. If you do not sign up before the expiration date, you must request a new code. · [facility] Access Code: [email or web address] Expires: 4/12/2017  3:52 PM 
 
4. Enter the last four digits of your Social Security Number (xxxx) and Date of Birth (mm/dd/yyyy) as indicated and click Submit. You will be taken to the next sign-up page. 5. Create a [facility] ID. This will be your [facility] login ID and cannot be changed, so think of one that is secure and easy to remember. 6. Create a [facility] password. You can change your password at any time. 7. Enter your Password Reset Question and Answer. This can be used at a later time if you forget your password. 8. Enter your e-mail address. You will receive e-mail notification when new information is available in [address] 9. Click Sign Up. You can now view and download portions of your medical record. 10. Click the Download Summary menu link to download a portable copy of your medical information. If you have questions, please visit the Frequently Asked Questions section of the [facility] website. Remember, [facility] is NOT to be used for urgent needs. For medical emergencies, dial 911. Now available from your iPhone and Android! General Information Please provide this summary of care documentation to your next provider. Patient Signature:  ____________________________________________________________ Date:  ____________________________________________________________  
  
Nancy Leiva Provider Signature:  ____________________________________________________________ Date:  ____________________________________________________________

## 2017-01-19 NOTE — PROCEDURES
PRE-ELECTROPHYSIOLOGY STUDY DIAGNOSES:  1. Bradycardia due to nonreversible sinus node dysfunction with HR<60 BPM    PROCEDURE PERFORMED:  1. Insertion of a Biotronik MRI DDDR pacemaker. Anesthesia: MAC     Estimated Blood Loss: Less than 10 mL     Specimens: * No specimens in log *     PROCEDURE IN DETAIL: The patient was brought into the EP lab in a fasting  state. The left shoulder was prepped and draped in the usual sterile  fashion. Skin anesthetized with lidocaine with epinephrine. Incision made  in the region of the deltopectoral groove. Pocket was made for the  pacemaker. Access obtained in the left subclavian vein via  the Seldinger technique x 2 over which 2 separate guidewires were placed. Over the first guidewire, a 7-Kosovan sheath was placed through which a  Biotronik right ventricle lead, model Setrox , 53 cm was placed. The lead  achieved the following values: R waves 9.7mV, threshold  was 0.6 V at 0.5 msec pulse width. This lead was then secured to the  pectoral fascia with 0 Ti-Cron x2. Over the second guidewire, a 7-Kosovan sheath was placed through which a Biotronik right  atrial lead, model Solia S  , 45 cm was placed, which achieved the following  values: P waves were 3.0mV, threshold was 0.8 volt at 0.4msec pulse width. This lead was then secured to the pectoral fascia with 0 Ti-Cron x2. Pocket was irrigated with triple antibiotic flush. The leads were  connected to a Biotronik pacemaker, model Eluna 8 DR-T  serial [id number]. The leads and pacemaker were then placed  in the pocket area. Fascial layer was closed with 2-0 Vicryl, followed by  a next layer of 3-0 Vicryl, followed by a superficial layer of staples. The wound was dressed. The patient recovered from his sedation and  transferred from the lab in a stable condition.     IMPRESSION: Successful implantation of a Biotronik MRI  pacemaker  for treatment of symptomatic sinus node dysfunction bradycardia

## 2017-01-19 NOTE — PROGRESS NOTES
Report received from Clarice Samuel EP Lab RN. Procedural findings communicated. Intra procedural  medication administration reviewed. Progression of care discussed. Patient received into [address] 1 post pacemaker placement.     Access site without bleeding or swelling yes    Dressing dry and intact yes    Patient instructed to limit movement to left upper extremity    Routine post procedural vital signs and site assessment initiated yes

## 2017-01-19 NOTE — PROGRESS NOTES
Patient received to [address] [place] [address] room # 1  Ambulatory from [facility]. Patient scheduled for pacemaker today with Dr Jonas Haines. Procedure reviewed & questions answered, voiced good understanding consent obtained & placed on chart. All medications and medical history reviewed. Will prep patient per orders. Patient & family updated on plan of care.

## 2017-01-19 NOTE — ANESTHESIA PREPROCEDURE EVALUATION
Anesthetic History   No history of anesthetic complications            Review of Systems / Medical History  Patient summary reviewed and pertinent labs reviewed    Pulmonary  Within defined limits                 Neuro/Psych   Within defined limits           Cardiovascular    Hypertension: well controlled      CHF        Exercise tolerance: >4 METS  Comments: Pulmonary HTN   GI/Hepatic/Renal  Within defined limits              Endo/Other      Hypothyroidism: well controlled  Arthritis     Other Findings   Comments: Anxiety  insomnia           Physical Exam    Airway  Mallampati: II  TM Distance: 4 - 6 cm  Neck ROM: normal range of motion   Mouth opening: Normal     Cardiovascular    Rhythm: irregular  Rate: normal         Dental    Dentition: Lower partial plate and Upper partial plate     Pulmonary  Breath sounds clear to auscultation               Abdominal  GI exam deferred       Other Findings            Anesthetic Plan    ASA: 3  Anesthesia type: total IV anesthesia          Induction: Intravenous  Anesthetic plan and risks discussed with: Patient

## 2017-01-19 NOTE — PROGRESS NOTES
TRANSFER - OUT REPORT:    Verbal report given to Wilda Patton RN on Roque Linda  being transferred to  [phone] for routine progression of care       Report consisted of patients Situation, Background, Assessment and   Recommendations(SBAR). Information from the following report(s) Procedure Summary, MAR and Recent Results was reviewed with the receiving nurse. Lines:   Peripheral IV 01/24/11 Right Hand (Active)       Peripheral IV 01/19/17 Left Antecubital (Active)        Opportunity for questions and clarification was provided.

## 2017-01-19 NOTE — IP AVS SNAPSHOT
Current Discharge Medication List  
  
Take these medications at their scheduled times Dose & Instructions Dispensing Information Comments Morning Noon Evening Bedtime  
 amiodarone 200 mg tablet Commonly known as:  CORDARONE Your next dose is:  Tomorrow Dose:  200 mg Take 1 Tab by mouth daily. Quantity:  30 Tab Refills:  11  
     
  
   
   
   
  
 cephALEXin 500 mg capsule Commonly known as:  Jumela Brice Your next dose is: Today Dose:  500 mg Take 1 Cap by mouth three (3) times daily. Quantity:  10 Cap Refills:  0  
     
  
   
  
   
   
  
  
 cholecalciferol 1,000 unit tablet Commonly known as:  VITAMIN D3 Your next dose is:  Tomorrow Take  by mouth daily. Refills:  0  
     
  
   
   
   
  
 magnesium oxide 400 mg tablet Commonly known as:  MAG-OX Your next dose is:  Tomorrow Dose:  400 mg Take 400 mg by mouth daily. Refills:  0  
     
  
   
   
   
  
 metoprolol tartrate 50 mg tablet Commonly known as:  LOPRESSOR Your next dose is: Today Dose:  50 mg Take 1 Tab by mouth two (2) times a day. Quantity:  60 Tab Refills:  11  
     
  
   
   
   
  
  
 neomycin-polymyxin-hydrocortisone (buffered) 3.5-10,000-1 mg/mL-unit/mL-% otic suspension Commonly known as:  Andale Low Your next dose is: Today Dose:  3 Drop Administer 3 Drops into each ear four (4) times daily. Quantity:  10 mL Refills:  11  
     
  
   
  
   
  
   
  
  
 potassium chloride SR 10 mEq tablet Commonly known as:  KLOR-CON 10 Your next dose is:  Tomorrow Dose:  10 mEq Take 1 Tab by mouth daily. Quantity:  30 Tab Refills:  11  
     
  
   
   
   
  
 triamcinolone acetonide 0.1 % topical cream  
Commonly known as:  KENALOG Notes to Patient:  AS DIRECTED  
   
 as directed. Refills:  0 Take these medications as needed Dose & Instructions Dispensing Information Comments Morning Noon Evening Bedtime  
 oxyCODONE-acetaminophen 5-325 mg per tablet Commonly known as:  PERCOCET Notes to Patient:  AS NEEDED Dose:  1 Tab Take 1 Tab by mouth every four (4) hours as needed. Max Daily Amount: 6 Tabs. Quantity:  10 Tab Refills:  0 Take these medications as directed Dose & Instructions Dispensing Information Comments Morning Noon Evening Bedtime  
 hydroCHLOROthiazide 50 mg tablet Commonly known as:  HYDRODIURIL Your next dose is:  Tomorrow 1 po qd Quantity:  90 Tab Refills:  1  
     
  
   
   
   
  
 levothyroxine 50 mcg tablet Commonly known as:  SYNTHROID Your next dose is:  Tomorrow 1 po qd Quantity:  90 Tab Refills:  3 LORazepam 0.5 mg tablet Commonly known as:  ATIVAN Notes to Patient:  AS NEEDED  
   
 1 po every day prn Quantity:  90 Tab Refills:  1 Where to Get Your Medications Information about where to get these medications is not yet available ! Ask your nurse or doctor about these medications  
  amiodarone 200 mg tablet  
 cephALEXin 500 mg capsule  
 metoprolol tartrate 50 mg tablet  
 oxyCODONE-acetaminophen 5-325 mg per tablet  
 potassium chloride SR 10 mEq tablet

## 2017-01-20 ENCOUNTER — APPOINTMENT (OUTPATIENT)
Dept: GENERAL RADIOLOGY | Age: 82
End: 2017-01-20
Attending: INTERNAL MEDICINE
Payer: MEDICARE

## 2017-01-20 VITALS
DIASTOLIC BLOOD PRESSURE: 62 MMHG | WEIGHT: 114.2 LBS | OXYGEN SATURATION: 96 % | SYSTOLIC BLOOD PRESSURE: 151 MMHG | BODY MASS INDEX: 19.03 KG/M2 | HEART RATE: 81 BPM | HEIGHT: 65 IN | RESPIRATION RATE: 16 BRPM | TEMPERATURE: 97.5 F

## 2017-01-20 LAB
ANION GAP BLD CALC-SCNC: 10 MMOL/L (ref 7–16)
ATRIAL RATE: 41 BPM
BUN SERPL-MCNC: 21 MG/DL (ref 8–23)
CALCIUM SERPL-MCNC: 9 MG/DL (ref 8.3–10.4)
CALCULATED P AXIS, ECG09: NORMAL DEGREES
CALCULATED R AXIS, ECG10: -34 DEGREES
CALCULATED T AXIS, ECG11: 100 DEGREES
CHLORIDE SERPL-SCNC: 104 MMOL/L (ref 98–107)
CO2 SERPL-SCNC: 28 MMOL/L (ref 21–32)
CREAT SERPL-MCNC: 1 MG/DL (ref 0.6–1)
DIAGNOSIS, 93000: NORMAL
DIASTOLIC BP, ECG02: NORMAL MMHG
GLUCOSE SERPL-MCNC: 85 MG/DL (ref 65–100)
MAGNESIUM SERPL-MCNC: 2 MG/DL (ref 1.8–2.4)
P-R INTERVAL, ECG05: 224 MS
POTASSIUM SERPL-SCNC: 3.5 MMOL/L (ref 3.5–5.1)
Q-T INTERVAL, ECG07: 408 MS
QRS DURATION, ECG06: 86 MS
QTC CALCULATION (BEZET), ECG08: 476 MS
SODIUM SERPL-SCNC: 142 MMOL/L (ref 136–145)
SYSTOLIC BP, ECG01: NORMAL MMHG
T4 FREE SERPL-MCNC: 1.3 NG/DL (ref 0.78–1.46)
TSH SERPL DL<=0.005 MIU/L-ACNC: 1.7 UIU/ML (ref 0.36–3.74)
VENTRICULAR RATE, ECG03: 82 BPM

## 2017-01-20 PROCEDURE — 80048 BASIC METABOLIC PNL TOTAL CA: CPT | Performed by: INTERNAL MEDICINE

## 2017-01-20 PROCEDURE — 36415 COLL VENOUS BLD VENIPUNCTURE: CPT | Performed by: INTERNAL MEDICINE

## 2017-01-20 PROCEDURE — 74011250636 HC RX REV CODE- 250/636: Performed by: INTERNAL MEDICINE

## 2017-01-20 PROCEDURE — 74011250637 HC RX REV CODE- 250/637: Performed by: INTERNAL MEDICINE

## 2017-01-20 PROCEDURE — 84439 ASSAY OF FREE THYROXINE: CPT | Performed by: INTERNAL MEDICINE

## 2017-01-20 PROCEDURE — 74011000258 HC RX REV CODE- 258: Performed by: INTERNAL MEDICINE

## 2017-01-20 PROCEDURE — 99218 HC RM OBSERVATION: CPT

## 2017-01-20 PROCEDURE — 84443 ASSAY THYROID STIM HORMONE: CPT | Performed by: INTERNAL MEDICINE

## 2017-01-20 PROCEDURE — 83735 ASSAY OF MAGNESIUM: CPT | Performed by: INTERNAL MEDICINE

## 2017-01-20 PROCEDURE — 71020 XR CHEST PA LAT: CPT

## 2017-01-20 RX ORDER — AMIODARONE HYDROCHLORIDE 200 MG/1
200 TABLET ORAL DAILY
Qty: 30 TAB | Refills: 11 | Status: SHIPPED | OUTPATIENT
Start: 2017-01-20 | End: 2017-03-13 | Stop reason: SDUPTHER

## 2017-01-20 RX ORDER — METOPROLOL TARTRATE 50 MG/1
50 TABLET ORAL 2 TIMES DAILY
Qty: 60 TAB | Refills: 11 | Status: SHIPPED | OUTPATIENT
Start: 2017-01-20 | End: 2017-05-03 | Stop reason: SDUPTHER

## 2017-01-20 RX ORDER — OXYCODONE AND ACETAMINOPHEN 5; 325 MG/1; MG/1
1 TABLET ORAL
Qty: 10 TAB | Refills: 0 | Status: SHIPPED | OUTPATIENT
Start: 2017-01-20 | End: 2017-01-26 | Stop reason: ALTCHOICE

## 2017-01-20 RX ORDER — CEPHALEXIN 500 MG/1
500 CAPSULE ORAL 3 TIMES DAILY
Qty: 10 CAP | Refills: 0 | Status: SHIPPED | OUTPATIENT
Start: 2017-01-20 | End: 2017-01-26 | Stop reason: ALTCHOICE

## 2017-01-20 RX ORDER — POTASSIUM CHLORIDE 750 MG/1
10 TABLET, FILM COATED, EXTENDED RELEASE ORAL DAILY
Qty: 30 TAB | Refills: 11 | Status: SHIPPED | OUTPATIENT
Start: 2017-01-20 | End: 2017-05-03 | Stop reason: SDUPTHER

## 2017-01-20 RX ADMIN — CEPHALEXIN 500 MG: 500 CAPSULE ORAL at 08:52

## 2017-01-20 RX ADMIN — Medication 5 ML: at 06:18

## 2017-01-20 RX ADMIN — CEFAZOLIN SODIUM 1 G: 1 INJECTION, POWDER, FOR SOLUTION INTRAMUSCULAR; INTRAVENOUS at 08:52

## 2017-01-20 RX ADMIN — CEFAZOLIN SODIUM 1 G: 1 INJECTION, POWDER, FOR SOLUTION INTRAMUSCULAR; INTRAVENOUS at 00:18

## 2017-01-20 NOTE — DISCHARGE SUMMARY
Physician Discharge Summary     Patient ID:  Thania Brownlee  [id number]  82 y.o.  3/11/1926    Admit date: 1/19/2017    Discharge date and time: No discharge date for patient encounter. Admitting Physician: Cr Vivas MD     Primary Physician: Jana Hodges MD    Discharge Physician: Cr Vivas MD    Admission Diagnoses: Bradycardia [R00.1]    Discharge Diagnoses:   Patient Active Problem List    Diagnosis Date Noted    Pacemaker 01/19/2017    Bradycardia 01/16/2017    Long-term use of high-risk medication/chemo 05/11/2016    Anxiety State 05/11/2016    Pulmonary hypertension ([address] 05/11/2016    Palpitations 05/11/2016    Edema 05/11/2016    Pruritic rash 04/26/2016    Insomnia 10/28/2015    Hypothyroidism     HTN (hypertension)     CHF (congestive heart failure) [facility])        Cardiology Procedures this admission:    1. Pacemaker    Hospital Course: Patient had cardiac device placement. Post device CXR and device check were stable. Please see operation report for full implant details. Discharge Exam:     Visit Vitals    /78    Pulse 75    Temp 97.2 °F (36.2 °C)    Resp 18    Ht 5' 4.5\" (1.638 m)    Wt 114 lb 3.2 oz (51.8 kg)    SpO2 98%    Breastfeeding No    BMI 19.3 kg/m2     General Appearance:  Well developed, well nourished,alert and oriented x 3, and individual in no acute distress. Ears/Nose/Mouth/Throat:   Hearing grossly normal.         Neck: Supple. Chest:   Lungs clear to auscultation bilaterally. Cardiovascular:  Regular rate and rhythm, S1, S2 normal, no murmur. Abdomen:   Soft, non-tender, bowel sounds are active. Extremities: No edema bilaterally. Skin: Warm and dry. Disposition: Patient will be discharged home    Patient Instructions:   Current Discharge Medication List      START taking these medications    Details   amiodarone (CORDARONE) 200 mg tablet Take 1 Tab by mouth daily.   Qty: 30 Tab, Refills: 11    Associated Diagnoses: Bradycardia      cephALEXin (KEFLEX) 500 mg capsule Take 1 Cap by mouth three (3) times daily. Qty: 10 Cap, Refills: 0    Associated Diagnoses: Bradycardia      metoprolol tartrate (LOPRESSOR) 50 mg tablet Take 1 Tab by mouth two (2) times a day. Qty: 60 Tab, Refills: 11    Associated Diagnoses: Bradycardia      oxyCODONE-acetaminophen (PERCOCET) 5-325 mg per tablet Take 1 Tab by mouth every four (4) hours as needed. Max Daily Amount: 6 Tabs. Qty: 10 Tab, Refills: 0    Associated Diagnoses: Bradycardia      potassium chloride SR (KLOR-CON 10) 10 mEq tablet Take 1 Tab by mouth daily. Qty: 30 Tab, Refills: 11         CONTINUE these medications which have NOT CHANGED    Details   magnesium oxide (MAG-OX) 400 mg tablet Take 400 mg by mouth daily. 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 neomycin-polymyxin-hydrocortisone, buffered, (PEDIOTIC) 3.5-10,000-1 mg/mL-unit/mL-% otic suspension Administer 3 Drops into each ear four (4) times daily. 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          STOP taking these medications       amLODIPine (NORVASC) 5 mg tablet Comments:   Reason for Stopping:         carvedilol (COREG) 3.125 mg tablet Comments:   Reason for Stopping:         amLODIPine (NORVASC) 5 mg tablet Comments:   Reason for Stopping:         carvedilol (COREG) 3.125 mg tablet Comments:   Reason for Stopping: Referenced discharge instructions provided by nursing for diet and activity.     Follow-up with implanting physician and Pacemaker/ICD Clinic in 12 to 14 days    Signed:  Latrice Peraza MD  1/20/2017  8:04 AM

## 2017-01-20 NOTE — PROGRESS NOTES
Bedside and Verbal shift change report given to self (oncoming nurse) by Sherri Hector RN (offgoing nurse). Report included the following information SBAR, Kardex, MAR and Recent Results.

## 2017-01-20 NOTE — PROGRESS NOTES
Care Management Interventions  PCP Verified by CM: Yes  Transition of Care Consult (CM Consult): Discharge Planning  Discharge Durable Medical Equipment: No  Physical Therapy Consult: No  Occupational Therapy Consult: No  Speech Therapy Consult: No  Current Support Network: Lives Alone  Confirm Follow Up Transport: Family  Plan discussed with Pt/Family/Caregiver: Yes  Freedom of Choice Offered: Yes  Discharge Location  Discharge Placement: Home with family assistance    SW dc screening. S/P PM implant. Alert and oriented in all spheres. Resides alone in the independent living setting at [facility]. Ambulates and performs ADLs independently. Was driving regularly prior to admission. Has a local son. Home DME: EMILIANO ulloa  Has a PCP and Rx plan. Will be discharging to her son's home. Per Dr. Rafat Morgan, no [facility] services are indicated at this time. Observation letter given. Signed copy placed in chart.

## 2017-01-20 NOTE — PROGRESS NOTES
Problem: Pacer/ICD: Post-Procedure  Goal: *Hemodynamically stable  Outcome: Resolved/Met Date Met:  01/19/17  VSS.   Denies pain      Goal: *Optimal pain control at patients stated goal  Outcome: Resolved/Met Date Met:  01/19/17  Denies pain

## 2017-01-20 NOTE — DISCHARGE INSTRUCTIONS
Pacemaker Discharge Instructions    *Check the incision site frequently for swelling or bleeding. If you see any bleeding, lie down and apply pressure over the area with a clean town or washcloth. Notify your doctor for any redness, swelling, drainage or oozing from the puncture site. Notify your doctor for any fever or chills. *Any signs of infection including increased redness, suspicious drainage, or unexplained fever should be reported to the doctor immediately by calling [facility]. *Until cleared by the MD-- No raising the affected limb above 90 degrees (above the shoulders). No lifting with that arm. No driving. *You should sleep in the sling. *You may resume your usual diet. Drink more fluids than usual.    *Have a responsible person drive you home and stay with you for at least 24 hours after your procedure. *Leave the dressing to shoulder in place. The MD will remove it at your follow-up appointment. You may shower in 2 days (Sunday). Until cleared by MD, no tub baths, hot tubs or swimming. Pacemaker Placement: What to Expect at [facility] placement is surgery to put a pacemaker in your chest when you have bradycardia (a slow heart rate). A pacemaker is a small, battery-powered device that sends electrical signals to the heart to keep the heartbeat steady. Thin wires, called leads, carry the signals from the pacemaker to the heart. A pacemaker can prevent or reduce dizziness, fainting, and shortness of breath caused by a slow or unsteady heartbeat. Your chest may be sore where the doctor made the cut (incision) and put in the pacemaker. You also may have a bruise and mild swelling. These symptoms usually get better in 1 to 2 weeks. You may feel a hard ridge along the incision. This usually gets softer in the months after surgery. You may be able to see or feel the outline of the pacemaker under your skin.   You will probably be able to go back to work or your usual routine 1 to 2 weeks after surgery. Pacemaker batteries usually last 5 to 15 years. Your doctor will talk to you about how often you will need to have your pacemaker checked. You can safely use most household and office electronics such as kitchen appliances, electric power tools, and computers. You will need to stay away from things with strong magnetic and electrical fields such as an MRI machine (unless your pacemaker is safe for an MRI), welding equipment, and power generators. You can use a cell phone, but keep it at least 6 inches away from your pacemaker. Check with your doctor about what you need to stay away from, what you need to use with care, and what is okay to use. This care sheet gives you a general idea about how long it will take for you to recover. But each person recovers at a different pace. Follow the steps below to get better as quickly as possible. How can you care for yourself at home? Activity  · Rest when you feel tired. · Be active. Walking is a good choice. · For 4 to 6 weeks:  ¨ Avoid activities that strain your chest or upper arm muscles. This includes pushing a  or vacuum, mopping floors, swimming, or swinging a golf club or tennis racquet. ¨ Do not raise your arm (the one on the side of your body where the pacemaker is located) above your shoulder. ¨ Allow your body to heal. Don't move quickly or lift anything heavy until you are feeling better. · Many people are able to return to work within 1 to 2 weeks after surgery. · Ask your doctor when it is okay for you to have sex. Diet  · You can eat your normal diet. If your stomach is upset, try bland, low-fat foods like plain rice, broiled chicken, toast, and yogurt. Medicines  · Your doctor will tell you if and when you can restart your medicines. He or she will also give you instructions about taking any new medicines.   · If you take aspirin or some other blood thinner, be sure to talk to your doctor. He or she will tell you if and when to start taking this medicine again. Make sure that you understand exactly what your doctor wants you to do. · Be safe with medicines. Read and follow all instructions on the label. ¨ If the doctor gave you a prescription medicine for pain, take it as prescribed. ¨ If you are not taking a prescription pain medicine, ask your doctor if you can take an over-the-counter medicine. ¨ Do not take aspirin, ibuprofen (Advil, Motrin), naproxen (Aleve), or other nonsteroidal anti-inflammatory drugs (NSAIDs) unless your doctor says it is okay. · If your doctor prescribed antibiotics, take them as directed. Do not stop taking them just because you feel better. You need to take the full course of antibiotics. Incision care  · If you have strips of tape on the incision, leave the tape on for a week or until it falls off. · Keep the incision dry while it heals. Your doctor may recommend sponge baths for about 7 days, but do not get the incision wet. Your doctor will let you know when you may take showers. After a shower, pat the incision dry. · Don't use hydrogen peroxide or alcohol on the incision, which can slow healing. You may cover the area with a gauze bandage if it oozes fluid or rubs against clothing. Change the bandage every day. · Do not take a bath or get into a hot tub for the first 2 weeks, or until your doctor tells you it is okay. Other instructions  · Keep a medical ID card with you at all times that says you have a pacemaker. The card should include the  and model information. · Wear medical alert jewelry stating that you have a pacemaker. You can buy this at most [employer]. · Check your pulse as directed by your doctor. · Have your pacemaker checked as often as your doctor recommends. In some cases, this may be done over the phone or the Internet. Your doctor will give you instructions about how to do this.   Follow-up care is a key part of your treatment and safety. Be sure to make and go to all appointments, and call your doctor if you are having problems. It's also a good idea to know your test results and keep a list of the medicines you take. When should you call for help? Call 911 anytime you think you may need emergency care. For example, call if:  · You passed out (lost consciousness). · You have severe trouble breathing. · You have sudden chest pain and shortness of breath, or you cough up blood. · You have symptoms of a heart attack. These may include:  ¨ Chest pain or pressure, or a strange feeling in the chest.  ¨ Sweating. ¨ Shortness of breath. ¨ Nausea or vomiting. ¨ Pain, pressure, or a strange feeling in the back, neck, jaw, or upper belly or in one or both shoulders or arms. ¨ Lightheadedness or sudden weakness. ¨ A fast or irregular heartbeat. After you call 911, the  may tell you to chew 1 adult-strength or 2 to 4 low-dose aspirin. Wait for an ambulance. Do not try to drive yourself. · You have symptoms of a stroke. These may include:  ¨ Sudden numbness, tingling, weakness, or loss of movement in your face, arm, or leg, especially on only one side of your body. ¨ Sudden vision changes. ¨ Sudden trouble speaking. ¨ Sudden confusion or trouble understanding simple statements. ¨ Sudden problems with walking or balance. ¨ A sudden, severe headache that is different from past headaches. Call your doctor now or seek immediate medical care if:  · Your heartbeat feels very fast or slow, skips beats, or flutters. · You are dizzy or lightheaded, or you feel like you may faint. · You have new or increased shortness of breath. · You have pain that does not get better after you take pain medicine. · You have loose stitches, or your incision comes open. · Bright red blood has soaked through the bandage over your incision.   · You have signs of infection, such as:  ¨ Increased pain, swelling, warmth, or redness. ¨ Red streaks leading from the incision. ¨ Pus draining from the incision. ¨ A fever. · You have signs of a blood clot, such as:  ¨ Pain in your calf, back of the knee, thigh, or groin. ¨ Redness and swelling in your leg or groin. Watch closely for changes in your health, and be sure to contact your doctor if:  · You have any problems with your pacemaker. Where can you learn more? Go to http://maura-zheng.info/. Enter G550 in the search box to learn more about \"Pacemaker Placement: What to Expect at Home. \"  Current as of: August 4, 2016  Content Version: 11.1  © 6159-1531 [employer]. Care instructions adapted under license by [employer] (which disclaims liability or warranty for this information). If you have questions about a medical condition or this instruction, always ask your healthcare professional. [place] [address] any warranty or liability for your use of this information. Learning About Rhythm-Control Medicines  Introduction  Rhythm-control medicines return your heart to a normal rhythm. And they keep it at a normal rhythm. They are also called antiarrhythmics. Doctors may use these medicines if:  · You have bad symptoms. · You had electric cardioversion. Or you will have it. · You tried medicine to control your heart rate. But it did not help. These medicines can have serious side effects. Examples  · Amiodarone (Cordarone, Pacerone)  · Dofetilide (Tikosyn)  · Propafenone (Rythmol)  · Sotalol (Betapace AF)  Possible side effects  Side effects depend on which medicine you take. One serious one is a very irregular heart rate. Read the information that comes with your medicine. What to know about taking this medicine  · You may be in the hospital when you start this medicine. Your doctor will watch what it does to your heart. · Be sure to ask your doctor any questions.  You may want to know more about the pros and cons of the medicine. · Your doctor will check you often. He or she will make sure you are not having problems. Be sure to go to all of your visits. · You may need regular blood tests. These make sure you are taking the right amount of medicine. · Take your medicines exactly as prescribed. Call your doctor if you think you are having a problem with your medicine. · Check with your doctor or pharmacist before you use any other medicines. This includes over-the-counter medicines. Make sure your doctor knows all of the medicines, vitamins, herbal products, and supplements you take. Taking some medicines together can cause problems. Where can you learn more? Go to http://maura-zheng.info/. Enter A903 in the search box to learn more about \"Learning About Rhythm-Control Medicines. \"  Current as of: January 27, 2016  Content Version: 11.1  © 7957-0615 [employer]. Care instructions adapted under license by [employer] (which disclaims liability or warranty for this information). If you have questions about a medical condition or this instruction, always ask your healthcare professional. [address] any warranty or liability for your use of this information. Heart-Healthy Diet: Care Instructions  Your Care Instructions    A heart-healthy diet has lots of vegetables, fruits, nuts, beans, and whole grains, and is low in salt. It limits foods that are high in saturated fat, such as meats, cheeses, and fried foods. It may be hard to change your diet, but even small changes can lower your risk of heart attack and heart disease. Follow-up care is a key part of your treatment and safety. Be sure to make and go to all appointments, and call your doctor if you are having problems. It's also a good idea to know your test results and keep a list of the medicines you take. How can you care for yourself at home?   Watch your portions  · Learn what a serving is. A \"serving\" and a \"portion\" are not always the same thing. Make sure that you are not eating larger portions than are recommended. For example, a serving of pasta is ½ cup. A serving size of meat is 2 to 3 ounces. A 3-ounce serving is about the size of a deck of cards. Measure serving sizes until you are good at [place]" them. Keep in mind that restaurants often serve portions that are 2 or 3 times the size of one serving. · To keep your energy level up and keep you from feeling hungry, eat often but in smaller portions. · Eat only the number of calories you need to stay at a healthy weight. If you need to lose weight, eat fewer calories than your body burns (through exercise and other physical activity). Eat more fruits and vegetables  · Eat a variety of fruit and vegetables every day. Dark green, deep orange, red, or yellow fruits and vegetables are especially good for you. Examples include spinach, carrots, peaches, and berries. · Keep carrots, celery, and other veggies handy for snacks. Buy fruit that is in season and store it where you can see it so that you will be tempted to eat it. · Cook dishes that have a lot of veggies in them, such as stir-fries and soups. Limit saturated and trans fat  · Read food labels, and try to avoid saturated and trans fats. They increase your risk of heart disease. Trans fat is found in many processed foods such as cookies and crackers. · Use olive or canola oil when you cook. Try cholesterol-lowering spreads, such as Benecol or Take Control. · Bake, broil, grill, or steam foods instead of frying them. · Choose lean meats instead of high-fat meats such as hot dogs and sausages. Cut off all visible fat when you prepare meat. · Eat fish, skinless poultry, and meat alternatives such as soy products instead of high-fat meats. Soy products, such as tofu, may be especially good for your heart. · Choose low-fat or fat-free milk and dairy products.   Eat fish  · Eat at least two servings of fish a week. Certain fish, such as salmon and tuna, contain omega-3 fatty acids, which may help reduce your risk of heart attack. Eat foods high in fiber  · Eat a variety of grain products every day. Include whole-grain foods that have lots of fiber and nutrients. Examples of whole-grain foods include oats, whole wheat bread, and brown rice. · Buy whole-grain breads and cereals, instead of white bread or pastries. Limit salt and sodium  · Limit how much salt and sodium you eat to help lower your blood pressure. · Taste food before you salt it. Add only a little salt when you think you need it. With time, your taste buds will adjust to less salt. · Eat fewer snack items, fast foods, and other high-salt, processed foods. Check food labels for the amount of sodium in packaged foods. · Choose low-sodium versions of canned goods (such as soups, vegetables, and beans). Limit sugar  · Limit drinks and foods with added sugar. These include candy, desserts, and soda pop. Limit alcohol  · Limit alcohol to no more than 2 drinks a day for men and 1 drink a day for women. Too much alcohol can cause health problems. When should you call for help? Watch closely for changes in your health, and be sure to contact your doctor if:  · You would like help planning heart-healthy meals. Where can you learn more? Go to http://maura-zheng.info/. Enter V137 in the search box to learn more about \"Heart-Healthy Diet: Care Instructions. \"  Current as of: January 27, 2016  Content Version: 11.1  © 2006-2016 [employer]. Care instructions adapted under license by [email or web address] (which disclaims liability or warranty for this information). If you have questions about a medical condition or this instruction, always ask your healthcare professional. [address] any warranty or liability for your use of this information.     DISCHARGE SUMMARY from Nurse    The following personal items are in your possession at time of discharge:    Dental Appliances: Partials, With patient  Visual Aid: Glasses  Hearing Aids/Status: Bilateral  Home Medications: Sent to pharmacy  Jewelry: None  Clothing: At bedside  Other Valuables: Eyeglasses     PATIENT INSTRUCTIONS:    After general anesthesia or intravenous sedation, for 24 hours or while taking prescription Narcotics:  · Limit your activities  · Do not drive and operate hazardous machinery  · Do not make important personal or business decisions  · Do  not drink alcoholic beverages  · If you have not urinated within 8 hours after discharge, please contact your surgeon on call. Report the following to your surgeon:  · Excessive pain, swelling, redness or odor of or around the surgical area  · Temperature over 100.5  · Nausea and vomiting lasting longer than 4 hours or if unable to take medications  · Any signs of decreased circulation or nerve impairment to extremity: change in color, persistent  numbness, tingling, coldness or increase pain  · Any questions    What to do at Home:  Recommended activity: No lifting, Driving, or Strenuous exercise until cleared by MD    If you experience any of the following symptoms or signs of infection, increased shortness of breath, or unrelieved pain, please follow up with [facility] at [phone]. *  Please give a list of your current medications to your Primary Care Provider. *  Please update this list whenever your medications are discontinued, doses are      changed, or new medications (including over-the-counter products) are added. *  Please carry medication information at all times in case of emergency situations. These are general instructions for a healthy lifestyle:    No smoking/ No tobacco products/ Avoid exposure to second hand smoke    Surgeon General's Warning:  Quitting smoking now greatly reduces serious risk to your health.     Obesity, smoking, and sedentary lifestyle greatly increases your risk for illness    A healthy diet, regular physical exercise & weight monitoring are important for maintaining a healthy lifestyle    You may be retaining fluid if you have a history of heart failure or if you experience any of the following symptoms:  Weight gain of 3 pounds or more overnight or 5 pounds in a week, increased swelling in our hands or feet or shortness of breath while lying flat in bed. Please call your doctor as soon as you notice any of these symptoms; do not wait until your next office visit. Recognize signs and symptoms of STROKE:    F-face looks uneven    A-arms unable to move or move unevenly    S-speech slurred or non-existent    T-time-call 911 as soon as signs and symptoms begin-DO NOT go       Back to bed or wait to see if you get better-TIME IS BRAIN. Warning Signs of HEART ATTACK     Call 911 if you have these symptoms:   Chest discomfort. Most heart attacks involve discomfort in the center of the chest that lasts more than a few minutes, or that goes away and comes back. It can feel like uncomfortable pressure, squeezing, fullness, or pain.  Discomfort in other areas of the upper body. Symptoms can include pain or discomfort in one or both arms, the back, neck, jaw, or stomach.  Shortness of breath with or without chest discomfort.  Other signs may include breaking out in a cold sweat, nausea, or lightheadedness. Don't wait more than five minutes to call 911 - MINUTES MATTER! Fast action can save your life. Calling 911 is almost always the fastest way to get lifesaving treatment. Emergency Medical Services staff can begin treatment when they arrive -- up to an hour sooner than if someone gets to the hospital by car. The discharge information has been reviewed with the patient. The patient verbalized understanding.   Discharge medications reviewed with the patient and appropriate educational materials and side effects teaching were provided.

## 2017-01-20 NOTE — PROGRESS NOTES
Dual skin assessment completed with second RN. Skin warm and dry. Face red due to rosacea - long term problem - no open areas noted. Several scabs noted to side of face. Eyelids noted to be reddened. Scattered bruising to bilateral arms. Left subclavian pacer site WDL with sling intact. Sacrum visualized and no abnormalities noted. Bilateral lover legs dry and scaly with no open areas noted.

## 2017-01-20 NOTE — PROGRESS NOTES
Bedside and Verbal shift change report given to Wilfrido Amaya RN (oncoming nurse) by self Zaida [employer] [facility] ). Report included the following information SBAR, Kardex, MAR and Recent Results.

## 2017-01-20 NOTE — PROGRESS NOTES
Discharge instructions reviewed with patient. Prescriptions given for Amiodarone, Metoprolol, Keflex, Potassium, and Percocet and med info sheets provided for all new medications. Opportunity for questions provided. Patient voiced understanding of all discharge instructions. IV and telemetry monitor removed. Skin tear under AC when removing IV. Dressed with Xeroform, gauze, and paper tape. Awaiting son to arrive to pick her up. Verbalized understanding to call when son arrives to transport out to vehicle.

## 2017-02-03 ENCOUNTER — HOSPITAL ENCOUNTER (OUTPATIENT)
Dept: ULTRASOUND IMAGING | Age: 82
Discharge: HOME | End: 2017-02-03
Attending: INTERNAL MEDICINE
Payer: MEDICARE

## 2017-02-03 PROCEDURE — 93971 EXTREMITY STUDY: CPT

## 2017-03-21 ENCOUNTER — HOSPITAL ENCOUNTER (EMERGENCY)
Age: 82
Discharge: HOME | End: 2017-03-22
Attending: EMERGENCY MEDICINE
Payer: MEDICARE

## 2017-03-21 ENCOUNTER — APPOINTMENT (OUTPATIENT)
Dept: GENERAL RADIOLOGY | Age: 82
End: 2017-03-21
Attending: EMERGENCY MEDICINE
Payer: MEDICARE

## 2017-03-21 VITALS
WEIGHT: 118 LBS | RESPIRATION RATE: 16 BRPM | DIASTOLIC BLOOD PRESSURE: 93 MMHG | SYSTOLIC BLOOD PRESSURE: 183 MMHG | HEART RATE: 75 BPM | HEIGHT: 65 IN | BODY MASS INDEX: 19.66 KG/M2 | TEMPERATURE: 98.1 F | OXYGEN SATURATION: 97 %

## 2017-03-21 DIAGNOSIS — S52.532A CLOSED COLLES' FRACTURE OF LEFT RADIUS, INITIAL ENCOUNTER: Primary | ICD-10-CM

## 2017-03-21 PROCEDURE — 99283 EMERGENCY DEPT VISIT LOW MDM: CPT | Performed by: EMERGENCY MEDICINE

## 2017-03-21 PROCEDURE — L3670 SO ACRO/CLAV CAN WEB PRE OTS: HCPCS

## 2017-03-21 PROCEDURE — 73090 X-RAY EXAM OF FOREARM: CPT

## 2017-03-21 PROCEDURE — 77030008298 HC SPLNT FBRGLS SCTCH 3M -A

## 2017-03-21 PROCEDURE — 75810000053 HC SPLINT APPLICATION: Performed by: EMERGENCY MEDICINE

## 2017-03-22 NOTE — ED TRIAGE NOTES
Patient presents after she fell at 1600 today and waited till this evening to call family since they have jobs and children to take care of.  She had tripped and caught herself with her left hand and has deformity to the left forearm just above the wrist.

## 2017-03-22 NOTE — ED PROVIDER NOTES
HPI Comments: Patient fell on outstretched left hand and now has a distal radial ulnar fracture. Pain and swelling with good finger and hand movement. Denies other injury      Patient is a 80 y.o. female presenting with fall and arm problem. The history is provided by the patient. No  was used. Fall   The accident occurred 3 to 5 hours ago. The fall occurred while walking. She fell from a height of ground level. She landed on hard floor. There was no blood loss. The point of impact was the left wrist. The pain is present in the left wrist. The pain is moderate. She was ambulatory at the scene. There was no entrapment after the fall. Pertinent negatives include no visual change, no fever, no numbness, no abdominal pain, no nausea, no vomiting, no headaches, no extremity weakness, no loss of consciousness, no tingling and no laceration. The risk factors include being elderly. The symptoms are aggravated by pressure on injury and use of injured limb. She has tried nothing for the symptoms. Arm Injury    This is a new problem. The current episode started 3 to 5 hours ago. The problem occurs constantly. The problem has not changed since onset. The pain is present in the left wrist. The quality of the pain is described as sharp. The pain is moderate. Associated symptoms include limited range of motion. Pertinent negatives include no numbness, no tingling, no back pain and no neck pain. The symptoms are aggravated by palpation. She has tried nothing for the symptoms. There has been a history of trauma.         Past Medical History:   Diagnosis Date    Anxiety State 5/11/2016    Arrhythmia     Arthritis     CHF (congestive heart failure) (HCC)     Edema 5/11/2016    HTN (hypertension)     Hypothyroidism     Insomnia 10/28/2015    Pruritic rash 4/26/2016    Pulmonary hypertension ([facility] [address] 5/11/2016       Past Surgical History:   Procedure Laterality Date    HX APPENDECTOMY      HX CARPAL TUNNEL RELEASE      HX KNEE REPLACEMENT      HX LEELA AND BSO      HX TONSIL AND ADENOIDECTOMY      HX TONSILLECTOMY           History reviewed. No pertinent family history. Social History     Social History    Marital status:      Spouse name: N/A    Number of children: N/A    Years of education: N/A     Occupational History    Not on file. Social History Main Topics    Smoking status: Never Smoker    Smokeless tobacco: Not on file    Alcohol use No    Drug use: No    Sexual activity: Not on file     Other Topics Concern    Not on file     Social History Narrative         ALLERGIES: Demerol [meperidine]; Morphine sulfate; Sulfa (sulfonamide antibiotics); Darvon [propoxyphene]; and Pcn [penicillins]    Review of Systems   Constitutional: Negative for chills and fever. Eyes: Negative for pain and redness. Respiratory: Negative for chest tightness, shortness of breath and wheezing. Cardiovascular: Negative for chest pain and leg swelling. Gastrointestinal: Negative for abdominal pain, diarrhea, nausea and vomiting. Musculoskeletal: Positive for arthralgias and joint swelling. Negative for back pain, extremity weakness, gait problem, neck pain and neck stiffness. Skin: Negative for color change, rash and wound. Neurological: Negative for tingling, loss of consciousness, weakness, numbness and headaches. Vitals:    03/21/17 2233   BP: (!) 183/93   Pulse: 75   Resp: 16   Temp: 98.1 °F (36.7 °C)   SpO2: 97%   Weight: 53.5 kg (118 lb)   Height: 5' 4.5\" (1.638 m)            Physical Exam   Constitutional: She is oriented to person, place, and time. She appears well-developed and well-nourished. HENT:   Head: Normocephalic and atraumatic. Neck: Normal range of motion. Neck supple. Cardiovascular: Normal rate and regular rhythm. No murmur heard. Pulmonary/Chest: Effort normal and breath sounds normal. She has no wheezes. Abdominal: Soft.  Bowel sounds are normal. There is no tenderness. Musculoskeletal: She exhibits edema, tenderness and deformity. Arms:  Neurological: She is alert and oriented to person, place, and time. Skin: Skin is warm and dry. No laceration noted. Nursing note and vitals reviewed. MDM  Number of Diagnoses or Management Options  Diagnosis management comments: Pt with distal radial ulnar fracture on left. Will place in a sugar tong splint and sling and refer to ortho. Amount and/or Complexity of Data Reviewed  Tests in the radiology section of CPT®: ordered and reviewed    Patient Progress  Patient progress: stable    ED Course       Procedures       XR FOREARM LT AP/LAT (Final result) Result time: 03/21/17 23:17:03     Final result by Christine Aguilar MD (03/21/17 23:17:03)     Impression:     IMPRESSION:    1. Impacted/angulated distal radius fracture. 2. Osteopenia.     Narrative:     Two views left forearm    INDICATION: Fall. Distal forearm pain and deformity. TECHNIQUE: AP and lateral views of the forearm. FINDINGS:    There is an impacted fracture of the distal radius, extending from the  metaphysis to the distal diaphysis. Dominant fracture component is a transverse  fracture through the metaphysis. There is apex dorsal angulation. There is  suggestion of nondisplaced ulnar styloid fracture. Radiocarpal alignment is  maintained. Bones are osteopenic.  There are degenerative changes at the first  carpometacarpal joint.

## 2017-03-22 NOTE — DISCHARGE INSTRUCTIONS
Broken Wrist: Care Instructions  Your Care Instructions    Your wrist can break, or fracture, during sports, a fall, or other accidents. The break may happen when your wrist is hit or is used to protect you in a fall. Fractures can range from a small, hairline crack, to a bone or bones broken into two or more pieces. Your treatment depends on how bad the break is. Your doctor may have put your wrist in a cast or splint. This will help keep your wrist stable until your follow-up appointment. It may take weeks or months for your wrist to heal. You can help it heal with care at home. You heal best when you take good care of yourself. Eat a variety of healthy foods, and don't smoke. Follow-up care is a key part of your treatment and safety. Be sure to make and go to all appointments, and call your doctor if you are having problems. It's also a good idea to know your test results and keep a list of the medicines you take. How can you care for yourself at home? · Put ice or a cold pack on your wrist for 10 to 20 minutes at a time. Try to do this every 1 to 2 hours for the next 3 days (when you are awake). Put a thin cloth between the ice and your cast or splint. Keep your cast or splint dry. · Follow the splint or cast care instructions your doctor gives you. If you have a splint, do not take it off unless your doctor tells you to. Be careful not to put the splint on too tight. · Be safe with medicines. Take pain medicines exactly as directed. ¨ If the doctor gave you a prescription medicine for pain, take it as prescribed. ¨ If you are not taking a prescription pain medicine, ask your doctor if you can take an over-the-counter medicine. · Prop up your wrist on pillows when you sit or lie down in the first few days after the injury. Keep your wrist higher than the level of your heart. This will help reduce swelling.   · Move your fingers often to reduce swelling and stiffness, but do not use that hand to grab or carry anything. · Follow instructions for exercises to keep your arm strong. When should you call for help? Call your doctor now or seek immediate medical care if:  · You have increased or severe pain. · Your cast or splint feels too tight. · You cannot move your fingers. · You have tingling, weakness, or numbness in your hand and fingers. · Your hand and fingers are cool or pale or change color. · You have a lot of swelling near your cast or splint. · The skin under your cast or splint is burning or stinging. Watch closely for changes in your health, and be sure to contact your doctor if:  · You do not get better as expected. Where can you learn more? Go to http://maura-zheng.info/. Enter [id number] in the search box to learn more about \"Broken Wrist: Care Instructions. \"  Current as of: May 23, 2016  Content Version: 11.1  © 9298-9729 [employer]. Care instructions adapted under license by [employer] (which disclaims liability or warranty for this information). If you have questions about a medical condition or this instruction, always ask your healthcare professional. [address] any warranty or liability for your use of this information.

## 2017-03-23 ENCOUNTER — PATIENT OUTREACH (OUTPATIENT)
Dept: CASE MANAGEMENT | Age: 82
End: 2017-03-23

## 2017-03-24 ENCOUNTER — HOSPITAL ENCOUNTER (OUTPATIENT)
Dept: SURGERY | Age: 82
Discharge: HOME | End: 2017-03-24
Payer: MEDICARE

## 2017-03-24 VITALS
HEIGHT: 63 IN | BODY MASS INDEX: 21.09 KG/M2 | TEMPERATURE: 97.8 F | DIASTOLIC BLOOD PRESSURE: 84 MMHG | SYSTOLIC BLOOD PRESSURE: 162 MMHG | WEIGHT: 119.06 LBS | HEART RATE: 75 BPM | RESPIRATION RATE: 15 BRPM | OXYGEN SATURATION: 95 %

## 2017-03-24 LAB — POTASSIUM SERPL-SCNC: 3.7 MMOL/L (ref 3.5–5.1)

## 2017-03-24 PROCEDURE — 84132 ASSAY OF SERUM POTASSIUM: CPT | Performed by: ANESTHESIOLOGY

## 2017-03-24 RX ORDER — HYDROCHLOROTHIAZIDE 50 MG/1
50 TABLET ORAL DAILY
COMMUNITY
End: 2017-05-03 | Stop reason: SDUPTHER

## 2017-03-24 RX ORDER — LEVOTHYROXINE SODIUM 50 UG/1
50 TABLET ORAL
COMMUNITY
End: 2017-06-05 | Stop reason: SDUPTHER

## 2017-03-24 RX ORDER — AMLODIPINE BESYLATE 2.5 MG/1
2.5 TABLET ORAL DAILY
COMMUNITY
End: 2017-06-05 | Stop reason: SDUPTHER

## 2017-03-24 NOTE — PROGRESS NOTES
This note will not be viewable in [address] [facility] OUTREACH #2  Left message for call back    No returned calls.   Will schedule follow up call

## 2017-03-24 NOTE — PERIOP NOTES
Spoke with [facility] ([place]) with [address] representative. Representative to be present DOS.

## 2017-03-24 NOTE — PERIOP NOTES
Patient verified name, , and surgery as listed in [facility]. TYPE  CASE:1B  Orders per surgeon: Received  Labs per surgeon:none  Labs per anesthesia protocol: Potassium : results pending  EKG  :  EKG 17,ECHO 7.715/Pacemaker integg 2.3.17/Last office note 17, 3.13.17      Patient provided with handouts including guide to surgery , transfusions, pain management and hand hygiene for the family and community. Pt verbalizes understanding of all pre-op instructions . Instructed that family must be present in building at all times. Hibiclens and instructions given per hospital policy. Instructed patient to continue  previous medications as prescribed prior to surgery and  to take the following medications the day of surgery according to anesthesia guidelines : Amiodarone,Norvasc,Synthroid,Lopressor       Original medication prescription bottles where not visualized during patient appointment. Medication profile updated and reviewed with patient and son    Continue all previous medications unless otherwise directed. Instructed patient to hold  the following medications prior to surgery: Vitamin D,Centrum Silver,Mag Oxide      Patient verbalized understanding of all instructions and provided all medical/health information to the best of their ability.

## 2017-03-24 NOTE — PROGRESS NOTES
This note will not be viewable in [address]       BRIT OUTREACH #1  Left message for call back

## 2017-03-28 ENCOUNTER — ANESTHESIA EVENT (OUTPATIENT)
Dept: SURGERY | Age: 82
End: 2017-03-28
Payer: MEDICARE

## 2017-03-28 ENCOUNTER — ANESTHESIA (OUTPATIENT)
Dept: SURGERY | Age: 82
End: 2017-03-28
Payer: MEDICARE

## 2017-03-28 ENCOUNTER — HOSPITAL ENCOUNTER (OUTPATIENT)
Age: 82
Setting detail: OUTPATIENT SURGERY
Discharge: HOME | End: 2017-03-28
Attending: ORTHOPAEDIC SURGERY | Admitting: ORTHOPAEDIC SURGERY
Payer: MEDICARE

## 2017-03-28 ENCOUNTER — APPOINTMENT (OUTPATIENT)
Dept: GENERAL RADIOLOGY | Age: 82
End: 2017-03-28
Attending: ORTHOPAEDIC SURGERY
Payer: MEDICARE

## 2017-03-28 ENCOUNTER — SURGERY (OUTPATIENT)
Age: 82
End: 2017-03-28

## 2017-03-28 VITALS
WEIGHT: 120 LBS | OXYGEN SATURATION: 98 % | DIASTOLIC BLOOD PRESSURE: 67 MMHG | HEART RATE: 76 BPM | BODY MASS INDEX: 21.26 KG/M2 | TEMPERATURE: 97.6 F | RESPIRATION RATE: 16 BRPM | SYSTOLIC BLOOD PRESSURE: 125 MMHG

## 2017-03-28 PROCEDURE — 77030008367 HC SPLNT ORTHGLS BSNM -A: Performed by: ORTHOPAEDIC SURGERY

## 2017-03-28 PROCEDURE — 77030018836 HC SOL IRR NACL ICUM -A: Performed by: ORTHOPAEDIC SURGERY

## 2017-03-28 PROCEDURE — 77030036550 HC SLNG ARM PCH S2SG -A: Performed by: ORTHOPAEDIC SURGERY

## 2017-03-28 PROCEDURE — 74011000250 HC RX REV CODE- 250

## 2017-03-28 PROCEDURE — 77030002966 HC SUT PDS J&J -A: Performed by: ORTHOPAEDIC SURGERY

## 2017-03-28 PROCEDURE — 77030020274 HC MISC IMPL ORTHOPEDIC: Performed by: ORTHOPAEDIC SURGERY

## 2017-03-28 PROCEDURE — 74011250636 HC RX REV CODE- 250/636: Performed by: ORTHOPAEDIC SURGERY

## 2017-03-28 PROCEDURE — 77030035101: Performed by: ORTHOPAEDIC SURGERY

## 2017-03-28 PROCEDURE — 76010000161 HC OR TIME 1 TO 1.5 HR INTENSV-TIER 1: Performed by: ORTHOPAEDIC SURGERY

## 2017-03-28 PROCEDURE — C1713 ANCHOR/SCREW BN/BN,TIS/BN: HCPCS | Performed by: ORTHOPAEDIC SURGERY

## 2017-03-28 PROCEDURE — 77030003900 HC BIT DRL TWST TRIM -C: Performed by: ORTHOPAEDIC SURGERY

## 2017-03-28 PROCEDURE — 76210000020 HC REC RM PH II FIRST 0.5 HR: Performed by: ORTHOPAEDIC SURGERY

## 2017-03-28 PROCEDURE — 74011250636 HC RX REV CODE- 250/636: Performed by: ANESTHESIOLOGY

## 2017-03-28 PROCEDURE — 76010010054 HC POST OP PAIN BLOCK: Performed by: ORTHOPAEDIC SURGERY

## 2017-03-28 PROCEDURE — 77030020275 HC MISC ORTHOPEDIC: Performed by: ORTHOPAEDIC SURGERY

## 2017-03-28 PROCEDURE — 74011250636 HC RX REV CODE- 250/636

## 2017-03-28 PROCEDURE — 77030021774 HC PLT PEG WRST TRIM -B: Performed by: ORTHOPAEDIC SURGERY

## 2017-03-28 PROCEDURE — 76942 ECHO GUIDE FOR BIOPSY: CPT | Performed by: ORTHOPAEDIC SURGERY

## 2017-03-28 PROCEDURE — 76060000033 HC ANESTHESIA 1 TO 1.5 HR: Performed by: ORTHOPAEDIC SURGERY

## 2017-03-28 PROCEDURE — 77030008853 HC WRE K TRIM -B: Performed by: ORTHOPAEDIC SURGERY

## 2017-03-28 PROCEDURE — 77030011640 HC PAD GRND REM COVD -A: Performed by: ORTHOPAEDIC SURGERY

## 2017-03-28 PROCEDURE — 77030002933 HC SUT MCRYL J&J -A: Performed by: ORTHOPAEDIC SURGERY

## 2017-03-28 PROCEDURE — 77030020754 HC CUF TRNQT 2BLA STRY -B: Performed by: ORTHOPAEDIC SURGERY

## 2017-03-28 PROCEDURE — 77030003602 HC NDL NRV BLK BBMI -B: Performed by: NURSE ANESTHETIST, CERTIFIED REGISTERED

## 2017-03-28 PROCEDURE — 74011250637 HC RX REV CODE- 250/637: Performed by: ORTHOPAEDIC SURGERY

## 2017-03-28 PROCEDURE — 76210000063 HC OR PH I REC FIRST 0.5 HR: Performed by: ORTHOPAEDIC SURGERY

## 2017-03-28 PROCEDURE — 77030002916 HC SUT ETHLN J&J -A: Performed by: ORTHOPAEDIC SURGERY

## 2017-03-28 DEVICE — SCR CORT LCK 3.2X12MM --: Type: IMPLANTABLE DEVICE | Site: ARM | Status: FUNCTIONAL

## 2017-03-28 DEVICE — IMPLANTABLE DEVICE: Type: IMPLANTABLE DEVICE | Site: ARM | Status: FUNCTIONAL

## 2017-03-28 DEVICE — PEG WRST THRD VOLAR 18MM --: Type: IMPLANTABLE DEVICE | Site: ARM | Status: FUNCTIONAL

## 2017-03-28 RX ORDER — PROPOFOL 10 MG/ML
INJECTION, EMULSION INTRAVENOUS AS NEEDED
Status: DISCONTINUED | OUTPATIENT
Start: 2017-03-28 | End: 2017-03-28 | Stop reason: HOSPADM

## 2017-03-28 RX ORDER — SODIUM CHLORIDE 0.9 % (FLUSH) 0.9 %
5-10 SYRINGE (ML) INJECTION AS NEEDED
Status: DISCONTINUED | OUTPATIENT
Start: 2017-03-28 | End: 2017-03-28 | Stop reason: HOSPADM

## 2017-03-28 RX ORDER — ROPIVACAINE HYDROCHLORIDE 5 MG/ML
INJECTION, SOLUTION EPIDURAL; INFILTRATION; PERINEURAL AS NEEDED
Status: DISCONTINUED | OUTPATIENT
Start: 2017-03-28 | End: 2017-03-28 | Stop reason: HOSPADM

## 2017-03-28 RX ORDER — LIDOCAINE HYDROCHLORIDE 10 MG/ML
0.3 INJECTION INFILTRATION; PERINEURAL ONCE
Status: DISCONTINUED | OUTPATIENT
Start: 2017-03-28 | End: 2017-03-28 | Stop reason: HOSPADM

## 2017-03-28 RX ORDER — CEFAZOLIN SODIUM IN 0.9 % NACL 2 G/50 ML
2 INTRAVENOUS SOLUTION, PIGGYBACK (ML) INTRAVENOUS ONCE
Status: COMPLETED | OUTPATIENT
Start: 2017-03-28 | End: 2017-03-28

## 2017-03-28 RX ORDER — FENTANYL CITRATE 50 UG/ML
100 INJECTION, SOLUTION INTRAMUSCULAR; INTRAVENOUS ONCE
Status: COMPLETED | OUTPATIENT
Start: 2017-03-28 | End: 2017-03-28

## 2017-03-28 RX ORDER — SODIUM CHLORIDE 0.9 % (FLUSH) 0.9 %
5-10 SYRINGE (ML) INJECTION EVERY 8 HOURS
Status: DISCONTINUED | OUTPATIENT
Start: 2017-03-28 | End: 2017-03-28 | Stop reason: HOSPADM

## 2017-03-28 RX ORDER — LIDOCAINE HYDROCHLORIDE 20 MG/ML
INJECTION, SOLUTION EPIDURAL; INFILTRATION; INTRACAUDAL; PERINEURAL AS NEEDED
Status: DISCONTINUED | OUTPATIENT
Start: 2017-03-28 | End: 2017-03-28 | Stop reason: HOSPADM

## 2017-03-28 RX ORDER — SODIUM CHLORIDE, SODIUM LACTATE, POTASSIUM CHLORIDE, CALCIUM CHLORIDE 600; 310; 30; 20 MG/100ML; MG/100ML; MG/100ML; MG/100ML
100 INJECTION, SOLUTION INTRAVENOUS CONTINUOUS
Status: DISCONTINUED | OUTPATIENT
Start: 2017-03-28 | End: 2017-03-28 | Stop reason: HOSPADM

## 2017-03-28 RX ORDER — PROPOFOL 10 MG/ML
INJECTION, EMULSION INTRAVENOUS
Status: DISCONTINUED | OUTPATIENT
Start: 2017-03-28 | End: 2017-03-28 | Stop reason: HOSPADM

## 2017-03-28 RX ORDER — OXYCODONE HYDROCHLORIDE 5 MG/1
10 TABLET ORAL
Status: DISCONTINUED | OUTPATIENT
Start: 2017-03-28 | End: 2017-03-28 | Stop reason: HOSPADM

## 2017-03-28 RX ORDER — SODIUM CHLORIDE, SODIUM LACTATE, POTASSIUM CHLORIDE, CALCIUM CHLORIDE 600; 310; 30; 20 MG/100ML; MG/100ML; MG/100ML; MG/100ML
100 INJECTION, SOLUTION INTRAVENOUS CONTINUOUS
Status: ACTIVE | OUTPATIENT
Start: 2017-03-28 | End: 2017-03-28

## 2017-03-28 RX ORDER — HYDROCODONE BITARTRATE AND ACETAMINOPHEN 7.5; 325 MG/1; MG/1
1 TABLET ORAL
Qty: 25 TAB | Refills: 0 | Status: SHIPPED | OUTPATIENT
Start: 2017-03-28 | End: 2017-05-03

## 2017-03-28 RX ORDER — ONDANSETRON 2 MG/ML
4 INJECTION INTRAMUSCULAR; INTRAVENOUS
Status: COMPLETED | OUTPATIENT
Start: 2017-03-28 | End: 2017-03-28

## 2017-03-28 RX ORDER — ACETAMINOPHEN 325 MG/1
650 TABLET ORAL
Status: COMPLETED | OUTPATIENT
Start: 2017-03-28 | End: 2017-03-28

## 2017-03-28 RX ORDER — HYDROMORPHONE HYDROCHLORIDE 2 MG/ML
0.5 INJECTION, SOLUTION INTRAMUSCULAR; INTRAVENOUS; SUBCUTANEOUS
Status: DISCONTINUED | OUTPATIENT
Start: 2017-03-28 | End: 2017-03-28 | Stop reason: HOSPADM

## 2017-03-28 RX ORDER — EPINEPHRINE 1 MG/ML
INJECTION, SOLUTION, CONCENTRATE INTRAVENOUS AS NEEDED
Status: DISCONTINUED | OUTPATIENT
Start: 2017-03-28 | End: 2017-03-28 | Stop reason: HOSPADM

## 2017-03-28 RX ORDER — ONDANSETRON 8 MG/1
8 TABLET, ORALLY DISINTEGRATING ORAL
Qty: 12 TAB | Refills: 1 | Status: SHIPPED | OUTPATIENT
Start: 2017-03-28 | End: 2017-05-03

## 2017-03-28 RX ADMIN — EPINEPHRINE 0.15 MG: 1 INJECTION, SOLUTION, CONCENTRATE INTRAVENOUS at 11:29

## 2017-03-28 RX ADMIN — LIDOCAINE HYDROCHLORIDE 10 ML: 20 INJECTION, SOLUTION EPIDURAL; INFILTRATION; INTRACAUDAL; PERINEURAL at 11:29

## 2017-03-28 RX ADMIN — ONDANSETRON 4 MG: 2 INJECTION INTRAMUSCULAR; INTRAVENOUS at 11:54

## 2017-03-28 RX ADMIN — ACETAMINOPHEN 650 MG: 325 TABLET, FILM COATED ORAL at 14:40

## 2017-03-28 RX ADMIN — PROPOFOL 50 MCG/KG/MIN: 10 INJECTION, EMULSION INTRAVENOUS at 12:34

## 2017-03-28 RX ADMIN — PROPOFOL 20 MG: 10 INJECTION, EMULSION INTRAVENOUS at 12:30

## 2017-03-28 RX ADMIN — ROPIVACAINE HYDROCHLORIDE 15 ML: 5 INJECTION, SOLUTION EPIDURAL; INFILTRATION; PERINEURAL at 11:29

## 2017-03-28 RX ADMIN — CEFAZOLIN 2 G: 1 INJECTION, POWDER, FOR SOLUTION INTRAMUSCULAR; INTRAVENOUS; PARENTERAL at 12:28

## 2017-03-28 RX ADMIN — FENTANYL CITRATE 100 MCG: 50 INJECTION, SOLUTION INTRAMUSCULAR; INTRAVENOUS at 11:24

## 2017-03-28 RX ADMIN — SODIUM CHLORIDE, SODIUM LACTATE, POTASSIUM CHLORIDE, AND CALCIUM CHLORIDE 100 ML/HR: 600; 310; 30; 20 INJECTION, SOLUTION INTRAVENOUS at 11:00

## 2017-03-28 NOTE — PERIOP NOTES
Nicki from [employer] in preop to speak with Dr. Meaghan Hamm about pt pacemaker for procedure. No changes made for this procedure.   If she is needed please call [phone]

## 2017-03-28 NOTE — ANESTHESIA PROCEDURE NOTES
Supraclavicular block with ultrasound    Start time: 3/28/2017 11:24 AM  End time: 3/28/2017 11:29 AM  Performed by: Jack Hernandez  Authorized by: Jack Hernandez       Pre-procedure:    Indications: at surgeon's request and post-op pain management    Preanesthetic Checklist: patient identified, risks and benefits discussed, site marked, timeout performed, anesthesia consent given and patient being monitored    Timeout Time: 11:24          Block Type:   Block Type:  Supraclavicular  Laterality:  Left  Monitoring:  Continuous pulse ox, frequent vital sign checks, heart rate, oxygen and responsive to questions  Injection Technique:  Single shot  Procedures: ultrasound guided    Patient Position: supine (head elevated 45 degrees)  Prep: chlorhexidine    Location:  Supraclavicular  Needle Type:  Stimuplex  Needle Gauge:  20 G  Needle Localization:  Ultrasound guidance  Medication Injected:  0.5%  ropivacaine  Adds:  Epi 1:200K  Volume (mL):  15  Add'l Medication Injected:  0.2%  lidocaine  Adds:  Epi 1:200K  Volume (mL):  10    Assessment:  Number of attempts:  1  Injection Assessment:  Incremental injection every 5 mL, local visualized surrounding nerve on ultrasound, negative aspiration for blood, no intravascular symptoms, no paresthesia and ultrasound image on chart  Patient tolerance:  Patient tolerated the procedure well with no immediate complications

## 2017-03-28 NOTE — PERIOP NOTES
Pt reports nausea and feeling warm, vs stable, pt denies pain.  Dr. Amber Bashir aware and gives verbal order for zofran iv now, read back and confirmed

## 2017-03-28 NOTE — OP NOTES
OPERATIVE NOTE    Misael Villalta is a 80 y.o. female with a fx of the distal radius that is not in satisfactory position for closed treatment. 3/28/2017    PREOP DIAGNOSIS: NON-ARTICULAR FRACTURE OF THE LEFT DISTAL RADIUS    POSTOP DIAGNOSIS:  NON-ARTICULAR FRACTURE OF THE LEFT DISTAL RADIUS    PROCEDURE:  OPEN REDUCTION AND INTERNAL FIXATION OF NON-ARTICULAR 2 PART FRACTURE OF THE LEFT DISTAL RADIUS    SURGEON:  Nelly Melgar MD    ANESTHESIA:  Regional Block    INDICATIONS: The planned procedure and alternatives for treatment have been discussed previously. Informed consent has been obtained. The operative site has been marked and perioperative antibiotics ordered if felt to be indicated. PROCEDURAL NOTE:   With the patient in the supine position,  the left upper extremity was prepped and draped as a sterile field. The patient had been given IV prophylactic antibiotics. A time out was held with the operative team and the patient, procedure, surgical site and surgeon identified. The extremity was exsanguinated with a sterile Esmarch bandage and a tourniquet around the upper arm inflated to 250 mm HG. A longitudinal skin incision was made over the flexor carpi radialis tendon. Small bleeders were electro coagulated. The FCR was released from it sheath and retracted out of harms way. The pronator quadratus and the distal part of the FPL muscle  were detached radially and retracted ulnarward protecting the median nerve and carpal tunnel. The volar aspect of the distal radius was exposed subperiosteally and the fracture fragments reduced. A 5 hole TriMed Variable Angle volar plate was placed over the fracture and positioned using the image intensifier to aid in the placement. I chose to use an extended 5 hole plate because of the thinness of the cortex of the radius in hopes of obtaining increased pull off strength as compared to a 3 hole plate.   It was temporarily held in place while permanent fixation was obtained using locking and non locked screws. At various times the position and length of the screws were checked with fluoroscopy. After all the necessary screws were in place the position was again checked with fluoro and the fixation seemed to be stable and well aligned with no overly long fixation extending into the soft tissues. The wound was thoroughly irrigated and the pronator reattached with 3-0 PDS sutures. The FCR sheath was also repaired with 3-0 PDS. The skin was closed with 4-0 nylon. Sterile dressings of Zeroform, 4x4's and Webril was applied followed by a volar fiberglass splint held in place with an Ace wrap. The tourniquet was released with return of flow to the fingers. The patient was sent to the recovery room in good condition. TOURNIQUET TIME:   Total Tourniquet Time Documented:  Upper Arm (Left) - 54 minutes  Total: Upper Arm (Left) - 54 minutes      IMPLANTS:   Implant Name Type Inv.  Item Serial No.  Lot No. LRB No. Used Action   PLATE BEAR LCK 5H 7 PEG LT -- VOLAR - [id number]  PLATE BEAR LCK 5H 7 PEG LT -- VOLAR  [employer] [id number] Left 1 Implanted   PEG WRST THRD VOLAR 16MM --  - [id number]  PEG WRST THRD VOLAR 16MM --   [employer] [id number] Left 3 Implanted   PEG WRST THRD VOLAR 18MM --  - [id number]  PEG WRST THRD VOLAR 18MM --   TRIMED [id number] Left 2 Implanted   unthreaded locking peg     [employer] [id number] Left 1 Implanted   SCR BNE MARIELA HEX 3.2X13MM --  - [id number]  SCR BNE MARIELA HEX 3.2X13MM --   TRIMED [id number] Left 1 Implanted   SCR MARIELA LCK 3.2X12MM --  - [id number]  SCR MARIELA LCK 3.2X12MM --   TRIMED [id number] Left 3 Implanted   SCR MARIELA LCK 3.2X14MM --  - [id number]  SCR MARIELA LCK 3.2X14MM --   TRIMED [id number] Left 1 Implanted       SIGNED: Max Tompkins MD

## 2017-03-28 NOTE — ANESTHESIA PREPROCEDURE EVALUATION
Anesthetic History   No history of anesthetic complications            Review of Systems / Medical History  Patient summary reviewed and pertinent labs reviewed    Pulmonary  Within defined limits                 Neuro/Psych   Within defined limits           Cardiovascular    Hypertension: well controlled      CHF        Exercise tolerance: >4 METS  Comments: Pulmonary HTN   GI/Hepatic/Renal  Within defined limits              Endo/Other      Hypothyroidism: well controlled  Arthritis     Other Findings   Comments: Anxiety  insomnia           Physical Exam    Airway  Mallampati: II  TM Distance: 4 - 6 cm  Neck ROM: normal range of motion   Mouth opening: Normal     Cardiovascular    Rhythm: irregular  Rate: normal         Dental    Dentition: Lower partial plate and Upper partial plate     Pulmonary  Breath sounds clear to auscultation               Abdominal  GI exam deferred       Other Findings            Anesthetic Plan    ASA: 3  Anesthesia type: total IV anesthesia      Post-op pain plan if not by surgeon: peripheral nerve block single    Induction: Intravenous  Anesthetic plan and risks discussed with: Patient and Son / Daughter

## 2017-03-28 NOTE — IP AVS SNAPSHOT
[address] 
[phone] Patient: Taty Rai MRN: [id number] :3/11/1926 You are allergic to the following Allergen Reactions Demerol (Meperidine) Nausea and Vomiting Morphine Sulfate Nausea and Vomiting  
    
 Sulfa (Sulfonamide Antibiotics) Rash Darvon (Propoxyphene) Unknown (comments) Unknown reaction Pcn (Penicillins) Rash Recent Documentation Weight BMI OB Status Smoking Status 54.4 kg 21.26 kg/m2 Hysterectomy Never Smoker Emergency Contacts Name Discharge Info Relation Home Work Mobile Lindsay Crockett  Child [2] [phone] [facility]  Other Relative [6] [phone] About your hospitalization You were admitted on:  2017 [facility] You were discharged on:  2017 Unit phone number:  [phone] Why you were hospitalized Your primary diagnosis was:  Not on File Providers Seen During Your Hospitalizations Provider Role Specialty Primary office phone Maciej Peoples MD Attending Provider Orthopedic Surgery [phone] Your Primary Care Physician (PCP) Primary Care Physician Office Phone Office Fax Dana Dex [phone] Follow-up Information Follow up With Details Comments Contact Info Na Gordon MD   [address] [place] [id number] 
[phone] Your Appointments Wednesday May 03, 2017 10:30 AM EDT  
OFFICE DEVICE CHECKS with G[facility]LE [facility] 39 [facility] ([address] [place] [address]) 2 [address]  
[phone] Wednesday May 03, 2017 11:00 AM EDT Office Visit with Patti Taylor MD  
[facility] ([address] [place] [address]) 2 [address]  
[phone] Current Discharge Medication List  
  
START taking these medications Dose & Instructions Dispensing Information Comments Morning Noon Evening Bedtime HYDROcodone-acetaminophen 7.5-325 mg per tablet Commonly known as:  Mary Zaragoza Your last dose was: Your next dose is:    
   
   
 Dose:  1 Tab Take 1 Tab by mouth every four (4) hours as needed for Pain. Max Daily Amount: 6 Tabs. Quantity:  25 Tab Refills:  0  
     
   
   
   
  
 ondansetron 8 mg disintegrating tablet Commonly known as:  ZOFRAN ODT Your last dose was: Your next dose is:    
   
   
 Dose:  8 mg Take 1 Tab by mouth every eight (8) hours as needed for Nausea. Quantity:  12 Tab Refills:  1 CONTINUE these medications which have NOT CHANGED Dose & Instructions Dispensing Information Comments Morning Noon Evening Bedtime  
 amiodarone 200 mg tablet Commonly known as:  CORDARONE Your last dose was: Your next dose is:    
   
   
 Dose:  200 mg Take 1 Tab by mouth daily. Quantity:  30 Tab Refills:  11 CENTRUM SILVER PO Your last dose was: Your next dose is:    
   
   
 Dose:  1 Tab Take 1 Tab by mouth daily. Refills:  0  
     
   
   
   
  
 cholecalciferol 1,000 unit tablet Commonly known as:  VITAMIN D3 Your last dose was: Your next dose is: Take  by mouth daily. Refills:  0  
     
   
   
   
  
 hydroCHLOROthiazide 50 mg tablet Commonly known as:  HYDRODIURIL Your last dose was: Your next dose is:    
   
   
 Dose:  25 mg Take 25 mg by mouth daily. Refills:  0 LORazepam 0.5 mg tablet Commonly known as:  ATIVAN Your last dose was: Your next dose is:    
   
   
 1 po every day prn Quantity:  90 Tab Refills:  1  
     
   
   
   
  
 magnesium oxide 400 mg tablet Commonly known as:  MAG-OX Your last dose was: Your next dose is:    
   
   
 Dose:  400 mg Take 400 mg by mouth daily. Refills:  0  
     
   
   
   
  
 metoprolol tartrate 50 mg tablet Commonly known as:  LOPRESSOR Your last dose was: Your next dose is:    
   
   
 Dose:  50 mg Take 1 Tab by mouth two (2) times a day. Quantity:  60 Tab Refills:  11 NORVASC 2.5 mg tablet Generic drug:  amLODIPine Your last dose was: Your next dose is:    
   
   
 Dose:  2.5 mg Take 2.5 mg by mouth daily. Refills:  0  
     
   
   
   
  
 potassium chloride SR 10 mEq tablet Commonly known as:  KLOR-CON 10 Your last dose was: Your next dose is:    
   
   
 Dose:  10 mEq Take 1 Tab by mouth daily. Quantity:  30 Tab Refills:  11  
     
   
   
   
  
 synthroid 50 mcg tablet Generic drug:  levothyroxine Your last dose was: Your next dose is:    
   
   
 Dose:  50 mcg Take 50 mcg by mouth Daily (before breakfast). Refills:  0  
     
   
   
   
  
 triamcinolone acetonide 0.1 % topical cream  
Commonly known as:  KENALOG Your last dose was: Your next dose is:    
   
   
 Apply  to affected area as needed. Refills:  0 Where to Get Your Medications Information on where to get these meds will be given to you by the nurse or doctor. ! Ask your nurse or doctor about these medications HYDROcodone-acetaminophen 7.5-325 mg per tablet  
 ondansetron 8 mg disintegrating tablet Discharge Instructions ACTIVITY · As tolerated and as directed by your doctor. · Bathe or shower as directed by your doctor. DIET · Clear liquids until no nausea or vomiting; then light diet for the first day. · Advance to regular diet on second day, unless your doctor orders otherwise. · If nausea and vomiting continues, call your doctor. PAIN 
· Take pain medication as directed by your doctor. · Call your doctor if pain is NOT relieved by medication. · DO NOT take aspirin of blood thinners unless directed by your doctor. DRESSING CARE  
 
 
CALL YOUR DOCTOR IF  
· Excessive bleeding that does not stop after holding pressure over the area · Temperature of 101 degrees F or above · Excessive redness, swelling or bruising, and/ or green or yellow, smelly discharge from incision AFTER ANESTHESIA · For the first 24 hours: DO NOT Drive, Drink alcoholic beverages, or Make important decisions. · Be aware of dizziness following anesthesia and while taking pain medication. APPOINTMENT DATE/ TIME 
 
YOUR DOCTOR'S PHONE NUMBER  
 
 
DISCHARGE SUMMARY from Nurse PATIENT INSTRUCTIONS: 
 
After general anesthesia or intravenous sedation, for 24 hours or while taking prescription Narcotics: · Limit your activities · Do not drive and operate hazardous machinery · Do not make important personal or business decisions · Do  not drink alcoholic beverages · If you have not urinated within 8 hours after discharge, please contact your surgeon on call. *  Please give a list of your current medications to your Primary Care Provider. *  Please update this list whenever your medications are discontinued, doses are 
    changed, or new medications (including over-the-counter products) are added. *  Please carry medication information at all times in case of emergency situations. These are general instructions for a healthy lifestyle: No smoking/ No tobacco products/ Avoid exposure to second hand smoke Surgeon General's Warning:  Quitting smoking now greatly reduces serious risk to your health. Obesity, smoking, and sedentary lifestyle greatly increases your risk for illness A healthy diet, regular physical exercise & weight monitoring are important for maintaining a healthy lifestyle You may be retaining fluid if you have a history of heart failure or if you experience any of the following symptoms:  Weight gain of 3 pounds or more overnight or 5 pounds in a week, increased swelling in our hands or feet or shortness of breath while lying flat in bed. Please call your doctor as soon as you notice any of these symptoms; do not wait until your next office visit. Recognize signs and symptoms of STROKE: 
 
F-face looks uneven A-arms unable to move or move unevenly S-speech slurred or non-existent T-time-call 911 as soon as signs and symptoms begin-DO NOT go Back to bed or wait to see if you get better-TIME IS BRAIN. POST OP INSTRUCTIONS 1. Patient has appointment for 4/6/17 at 3:35 PM at the [facility]. 2.  For problems call Dr Pilo Moralez, [address], [phone] Appointments only,  [phone] 
 
3. Ice and elevate the surgical site. 4.  Keep splints and dressing dry and intact. 5.  If able to tolerate, take  Ibuprofen 200 mg   3 every 6 hrs   OR   Aleve 220 mg 2 every 12 hrs to help with pain ( Do not take Ibuprofen or Aleve if taking any other anti inflamatory drug, NSAID, or anti arthritis drug or if taking blood thinners.) Vitamin C   500 mg  Once a day for 2 months. Discharge Orders None ACO Transitions of Care Introducing [facility] [employer] offers a voluntary care coordination program to provide high quality service and care to Martín Honeycutt fee-for-service beneficiaries. Cayetano Ovalle was designed to help you enhance your health and well-being through the following services: ? Transitions of Care  support for individuals who are transitioning from one care setting to another (example: Hospital to home). ? Chronic and Complex Care Coordination  support for individuals and caregivers of those with serious or chronic illnesses or with more than one chronic (ongoing) condition and those who take a number of different medications. If you meet specific medical criteria, a [facility] [address] may call you directly to coordinate your care with your primary care physician and your other care providers. For questions about the [facility] programs, please, contact your physicians office. For general questions or additional information about Accountable Care Organizations: 
Please visit www.medicare.gov/acos. html or call 1-800-MEDICARE (0-665.510.6031) TTY users should call 0-278.141.8868. Introducing \[facility]\"" & HEALTH SERVICES! Betsy Fierro introduces [employer] patient portal. Now you can access parts of your medical record, email your doctor's office, and request medication refills online. 1. In your internet browser, go to [email or web address] 2. Click on the First Time User? Click Here link in the Sign In box. You will see the New Member Sign Up page. 3. Enter your [employer] Access Code exactly as it appears below. You will not need to use this code after youve completed the sign-up process. If you do not sign up before the expiration date, you must request a new code. · [employer] Access Code: [email or web address] Expires: 4/12/2017  4:52 PM 
 
4. Enter the last four digits of your Social Security Number (xxxx) and Date of Birth (mm/dd/yyyy) as indicated and click Submit. You will be taken to the next sign-up page. 5. Create a [employer] ID. This will be your [employer] login ID and cannot be changed, so think of one that is secure and easy to remember. 6. Create a [employer] password. You can change your password at any time. 7. Enter your Password Reset Question and Answer. This can be used at a later time if you forget your password. 8. Enter your e-mail address. You will receive e-mail notification when new information is available in [address] 9. Click Sign Up. You can now view and download portions of your medical record. 10. Click the Download Summary menu link to download a portable copy of your medical information. If you have questions, please visit the Frequently Asked Questions section of the [employer] website. Remember, [employer] is NOT to be used for urgent needs. For medical emergencies, dial 911. Now available from your iPhone and Android! General Information Please provide this summary of care documentation to your next provider. Patient Signature:  ____________________________________________________________ Date:  ____________________________________________________________  
  
Olesya Quiñonez Provider Signature:  ____________________________________________________________ Date:  ____________________________________________________________

## 2017-03-28 NOTE — ANESTHESIA POSTPROCEDURE EVALUATION
Post-Anesthesia Evaluation and Assessment    Patient: Phillips Councilman MRN: [id number]  SSN: [id number]    YOB: 1926  Age: 80 y.o. Sex: female       Cardiovascular Function/Vital Signs  Visit Vitals    /67    Pulse 77    Temp 36.4 °C (97.6 °F)    Resp 16    Wt 54.4 kg (120 lb)    SpO2 99%    BMI 21.26 kg/m2       Patient is status post total IV anesthesia anesthesia for Procedure(s):  OPEN REDUCTION INTERNAL FIXATION LEFT DISTAL RADIUS. Nausea/Vomiting: None    Postoperative hydration reviewed and adequate. Pain:  Pain Scale 1: Numeric (0 - 10) (03/28/17 1344)  Pain Intensity 1: 0 (03/28/17 1344)   Managed    Neurological Status:   Neuro (WDL): Within Defined Limits (03/28/17 1344)   At baseline    Mental Status and Level of Consciousness: Awake and at baseline    Pulmonary Status:   O2 Device: Nasal cannula (03/28/17 1344)   Adequate oxygenation and airway patent    Complications related to anesthesia: None    Post-anesthesia assessment completed.  No concerns    Signed By: Charmayne November, MD     March 28, 2017

## 2017-03-28 NOTE — DISCHARGE INSTRUCTIONS
ACTIVITY  · As tolerated and as directed by your doctor. · Bathe or shower as directed by your doctor. DIET  · Clear liquids until no nausea or vomiting; then light diet for the first day. · Advance to regular diet on second day, unless your doctor orders otherwise. · If nausea and vomiting continues, call your doctor. PAIN  · Take pain medication as directed by your doctor. · Call your doctor if pain is NOT relieved by medication. · DO NOT take aspirin of blood thinners unless directed by your doctor. DRESSING CARE       CALL YOUR DOCTOR IF   · Excessive bleeding that does not stop after holding pressure over the area  · Temperature of 101 degrees F or above  · Excessive redness, swelling or bruising, and/ or green or yellow, smelly discharge from incision    AFTER ANESTHESIA   · For the first 24 hours: DO NOT Drive, Drink alcoholic beverages, or Make important decisions. · Be aware of dizziness following anesthesia and while taking pain medication. APPOINTMENT DATE/ TIME    YOUR DOCTOR'S PHONE NUMBER       DISCHARGE SUMMARY from Nurse    PATIENT INSTRUCTIONS:    After general anesthesia or intravenous sedation, for 24 hours or while taking prescription Narcotics:  · Limit your activities  · Do not drive and operate hazardous machinery  · Do not make important personal or business decisions  · Do  not drink alcoholic beverages  · If you have not urinated within 8 hours after discharge, please contact your surgeon on call. *  Please give a list of your current medications to your Primary Care Provider. *  Please update this list whenever your medications are discontinued, doses are      changed, or new medications (including over-the-counter products) are added. *  Please carry medication information at all times in case of emergency situations.       These are general instructions for a healthy lifestyle:    No smoking/ No tobacco products/ Avoid exposure to second hand smoke    Surgeon General's Warning:  Quitting smoking now greatly reduces serious risk to your health. Obesity, smoking, and sedentary lifestyle greatly increases your risk for illness    A healthy diet, regular physical exercise & weight monitoring are important for maintaining a healthy lifestyle    You may be retaining fluid if you have a history of heart failure or if you experience any of the following symptoms:  Weight gain of 3 pounds or more overnight or 5 pounds in a week, increased swelling in our hands or feet or shortness of breath while lying flat in bed. Please call your doctor as soon as you notice any of these symptoms; do not wait until your next office visit. Recognize signs and symptoms of STROKE:    F-face looks uneven    A-arms unable to move or move unevenly    S-speech slurred or non-existent    T-time-call 911 as soon as signs and symptoms begin-DO NOT go       Back to bed or wait to see if you get better-TIME IS BRAIN. POST OP INSTRUCTIONS      1. Patient has appointment for 4/6/17 at 3:35 PM at the [facility]. 2.  For problems call Dr Popeye Winkler, [facility],  [phone]          Appointments only,  [phone]    3. Ice and elevate the surgical site. 4.  Keep splints and dressing dry and intact. 5.  If able to tolerate, take  Ibuprofen 200 mg   3 every 6 hrs   OR   Aleve 220 mg 2 every 12 hrs to help with pain                                                   ( Do not take Ibuprofen or Aleve if taking any other anti inflamatory drug, NSAID, or anti arthritis drug or if taking blood thinners.)                                                 Vitamin C   500 mg  Once a day for 2 months.

## 2017-03-28 NOTE — IP AVS SNAPSHOT
Current Discharge Medication List  
  
START taking these medications Dose & Instructions Dispensing Information Comments Morning Noon Evening Bedtime HYDROcodone-acetaminophen 7.5-325 mg per tablet Commonly known as:  Michael Parr Your last dose was: Your next dose is:    
   
   
 Dose:  1 Tab Take 1 Tab by mouth every four (4) hours as needed for Pain. Max Daily Amount: 6 Tabs. Quantity:  25 Tab Refills:  0  
     
   
   
   
  
 ondansetron 8 mg disintegrating tablet Commonly known as:  ZOFRAN ODT Your last dose was: Your next dose is:    
   
   
 Dose:  8 mg Take 1 Tab by mouth every eight (8) hours as needed for Nausea. Quantity:  12 Tab Refills:  1 CONTINUE these medications which have NOT CHANGED Dose & Instructions Dispensing Information Comments Morning Noon Evening Bedtime  
 amiodarone 200 mg tablet Commonly known as:  CORDARONE Your last dose was: Your next dose is:    
   
   
 Dose:  200 mg Take 1 Tab by mouth daily. Quantity:  30 Tab Refills:  11 CENTRUM SILVER PO Your last dose was: Your next dose is:    
   
   
 Dose:  1 Tab Take 1 Tab by mouth daily. Refills:  0  
     
   
   
   
  
 cholecalciferol 1,000 unit tablet Commonly known as:  VITAMIN D3 Your last dose was: Your next dose is: Take  by mouth daily. Refills:  0  
     
   
   
   
  
 hydroCHLOROthiazide 50 mg tablet Commonly known as:  HYDRODIURIL Your last dose was: Your next dose is:    
   
   
 Dose:  25 mg Take 25 mg by mouth daily. Refills:  0 LORazepam 0.5 mg tablet Commonly known as:  ATIVAN Your last dose was: Your next dose is:    
   
   
 1 po every day prn Quantity:  90 Tab Refills:  1  
     
   
   
   
  
 magnesium oxide 400 mg tablet Commonly known as:  MAG-OX Your last dose was: Your next dose is:    
   
   
 Dose:  400 mg Take 400 mg by mouth daily. Refills:  0  
     
   
   
   
  
 metoprolol tartrate 50 mg tablet Commonly known as:  LOPRESSOR Your last dose was: Your next dose is:    
   
   
 Dose:  50 mg Take 1 Tab by mouth two (2) times a day. Quantity:  60 Tab Refills:  11 NORVASC 2.5 mg tablet Generic drug:  amLODIPine Your last dose was: Your next dose is:    
   
   
 Dose:  2.5 mg Take 2.5 mg by mouth daily. Refills:  0  
     
   
   
   
  
 potassium chloride SR 10 mEq tablet Commonly known as:  KLOR-CON 10 Your last dose was: Your next dose is:    
   
   
 Dose:  10 mEq Take 1 Tab by mouth daily. Quantity:  30 Tab Refills:  11  
     
   
   
   
  
 synthroid 50 mcg tablet Generic drug:  levothyroxine Your last dose was: Your next dose is:    
   
   
 Dose:  50 mcg Take 50 mcg by mouth Daily (before breakfast). Refills:  0  
     
   
   
   
  
 triamcinolone acetonide 0.1 % topical cream  
Commonly known as:  KENALOG Your last dose was: Your next dose is:    
   
   
 Apply  to affected area as needed. Refills:  0 Where to Get Your Medications Information on where to get these meds will be given to you by the nurse or doctor. ! Ask your nurse or doctor about these medications HYDROcodone-acetaminophen 7.5-325 mg per tablet  
 ondansetron 8 mg disintegrating tablet

## 2018-06-25 PROBLEM — I82.4Y3: Status: RESOLVED | Noted: 2018-06-25 | Resolved: 2018-06-25

## 2018-06-25 PROBLEM — I82.4Y3: Status: ACTIVE | Noted: 2018-06-25

## 2018-08-30 ENCOUNTER — HOSPITAL ENCOUNTER (OUTPATIENT)
Dept: LAB | Age: 83
Discharge: HOME | End: 2018-08-30
Payer: MEDICARE

## 2018-08-30 DIAGNOSIS — E03.9 HYPOTHYROIDISM, UNSPECIFIED TYPE: ICD-10-CM

## 2018-08-30 DIAGNOSIS — I10 ESSENTIAL HYPERTENSION: ICD-10-CM

## 2018-08-30 DIAGNOSIS — R00.1 BRADYCARDIA: ICD-10-CM

## 2018-08-30 DIAGNOSIS — I50.9 CONGESTIVE HEART FAILURE, UNSPECIFIED HF CHRONICITY, UNSPECIFIED HEART FAILURE TYPE (HCC): ICD-10-CM

## 2018-08-30 DIAGNOSIS — I27.20 PULMONARY HYPERTENSION (HCC): ICD-10-CM

## 2018-08-30 DIAGNOSIS — Z95.0 PACEMAKER: ICD-10-CM

## 2018-08-30 LAB
ANION GAP SERPL CALC-SCNC: 11 MMOL/L
BUN SERPL-MCNC: 19 MG/DL (ref 8–23)
CALCIUM SERPL-MCNC: 9.8 MG/DL (ref 8.3–10.4)
CHLORIDE SERPL-SCNC: 98 MMOL/L (ref 98–107)
CO2 SERPL-SCNC: 31 MMOL/L (ref 21–32)
CREAT SERPL-MCNC: 1.2 MG/DL (ref 0.6–1)
GLUCOSE SERPL-MCNC: 92 MG/DL (ref 65–100)
POTASSIUM SERPL-SCNC: 4.1 MMOL/L (ref 3.5–5.1)
SODIUM SERPL-SCNC: 140 MMOL/L (ref 136–145)

## 2018-08-30 PROCEDURE — 36415 COLL VENOUS BLD VENIPUNCTURE: CPT

## 2018-08-30 PROCEDURE — 80048 BASIC METABOLIC PNL TOTAL CA: CPT

## 2019-03-02 NOTE — PROGRESS NOTES
This note will not be viewable in [address] BRIT OUTREACH #3  Left message for call back      Unable to reach patient after several attempts. No returned calls. Will close case. DISCHARGE

## 2022-03-19 PROBLEM — Z95.0 PACEMAKER: Status: ACTIVE | Noted: 2017-01-19

## 2022-03-20 PROBLEM — R00.1 BRADYCARDIA: Status: ACTIVE | Noted: 2017-01-16

## 2022-08-18 PROCEDURE — 93294 REM INTERROG EVL PM/LDLS PM: CPT | Performed by: INTERNAL MEDICINE

## 2022-08-18 PROCEDURE — 93296 REM INTERROG EVL PM/IDS: CPT | Performed by: INTERNAL MEDICINE

## 2022-08-19 DIAGNOSIS — R00.1 BRADYCARDIA, UNSPECIFIED: ICD-10-CM

## 2022-08-19 RX ORDER — METOPROLOL TARTRATE 50 MG/1
TABLET, FILM COATED ORAL
Qty: 180 TABLET | Refills: 1 | Status: SHIPPED | OUTPATIENT
Start: 2022-08-19

## 2022-10-10 DIAGNOSIS — Z95.0 PACEMAKER: ICD-10-CM

## 2022-10-10 DIAGNOSIS — R00.1 BRADYCARDIA: ICD-10-CM

## 2022-10-10 DIAGNOSIS — I50.9 CHF (CONGESTIVE HEART FAILURE) (HCC): Primary | ICD-10-CM

## 2022-10-26 ENCOUNTER — TELEPHONE (OUTPATIENT)
Dept: CARDIOLOGY CLINIC | Age: 87
End: 2022-10-26

## 2022-10-26 RX ORDER — TRIAMCINOLONE ACETONIDE 0.25 MG/G
CREAM TOPICAL
COMMUNITY
Start: 2022-10-20

## 2022-10-26 RX ORDER — POTASSIUM CHLORIDE 750 MG/1
TABLET, EXTENDED RELEASE ORAL
COMMUNITY
Start: 2022-10-18

## 2022-10-26 RX ORDER — TORSEMIDE 20 MG/1
TABLET ORAL
Status: ON HOLD | COMMUNITY
Start: 2022-10-25 | End: 2022-12-08 | Stop reason: HOSPADM

## 2022-10-26 RX ORDER — ASPIRIN 81 MG/1
TABLET, COATED ORAL
Status: ON HOLD | COMMUNITY
Start: 2022-10-18 | End: 2022-12-08 | Stop reason: HOSPADM

## 2022-10-26 RX ORDER — SENNOSIDES 8.6 MG
TABLET ORAL
Status: ON HOLD | COMMUNITY
Start: 2022-10-07 | End: 2022-12-08 | Stop reason: HOSPADM

## 2022-10-26 NOTE — TELEPHONE ENCOUNTER
Son came into office with pt. Wanted to make sure Dr Sharon Varela was aware the [facility] doctor has discontinued HCTZ and Eliquis and replaced it with 81mg ASA about three weeks ago. Also she has been started on 20mg Torsemide BID and also 10mg of Potassium. Due to her being uncomfortable and having to constantly urinate they would like to cut back or d/c Torsemide. He will consult the [facility] physician but wanted Dr Forrest Lynn opinion also. Would like advising on the diuretic. Pt is currently residing at [facility].

## 2022-10-27 NOTE — TELEPHONE ENCOUNTER
Spoke to pts son made her aware per Dr Felecia Hernández pt could try taking Torsemide daily 3 days a week. Also he feels pt needs to establish care with a general cardiologist for routine maintenance care.  Pts son verbalized understanding    Will send to scheduling to schedule with Dr Luis Thao

## 2022-10-31 ENCOUNTER — TELEPHONE (OUTPATIENT)
Dept: CARDIOLOGY CLINIC | Age: 87
End: 2022-10-31

## 2022-10-31 DIAGNOSIS — Z95.0 PACEMAKER: ICD-10-CM

## 2022-10-31 DIAGNOSIS — I50.9 CHF (CONGESTIVE HEART FAILURE) (HCC): Primary | ICD-10-CM

## 2022-10-31 DIAGNOSIS — R60.9 EDEMA: ICD-10-CM

## 2022-10-31 DIAGNOSIS — I27.20 PULMONARY HYPERTENSION (HCC): ICD-10-CM

## 2022-10-31 NOTE — TELEPHONE ENCOUNTER
Pts son Nena Gannon requested referral for home health to assist with patient care. Will place order and send information to [address] [place] [address].

## 2022-11-01 ENCOUNTER — HOME HEALTH ADMISSION (OUTPATIENT)
Dept: HOME HEALTH SERVICES | Facility: HOME HEALTH | Age: 87
End: 2022-11-01

## 2022-11-15 DIAGNOSIS — R00.1 BRADYCARDIA: ICD-10-CM

## 2022-11-15 DIAGNOSIS — Z95.0 PACEMAKER: Primary | ICD-10-CM

## 2022-11-28 RX ORDER — APIXABAN 2.5 MG/1
TABLET, FILM COATED ORAL
Qty: 180 TABLET | Refills: 3 | Status: ON HOLD | OUTPATIENT
Start: 2022-11-28

## 2022-11-28 NOTE — TELEPHONE ENCOUNTER
Requested Prescriptions     Pending Prescriptions Disp Refills    ELIQUIS 2.5 MG TABS tablet [Pharmacy Med Name: ELIQUIS 2.5 MG TABLET] 180 tablet 3     Sig: TAKE 1 TABLET BY MOUTH TWO TIMES A DAY.

## 2022-12-04 ENCOUNTER — HOSPITAL ENCOUNTER (EMERGENCY)
Dept: GENERAL RADIOLOGY | Age: 87
Discharge: HOME | DRG: 481 | End: 2022-12-07
Payer: MEDICARE

## 2022-12-04 ENCOUNTER — HOSPITAL ENCOUNTER (INPATIENT)
Age: 87
LOS: 1 days | Discharge: ANOTHER HOSPITAL | DRG: 481 | End: 2022-12-04
Attending: GENERAL PRACTICE | Admitting: FAMILY MEDICINE
Payer: MEDICARE

## 2022-12-04 ENCOUNTER — HOSPITAL ENCOUNTER (INPATIENT)
Age: 87
LOS: 4 days | Discharge: NURSING, REHAB OR LONG-TERM CARE | DRG: 481 | End: 2022-12-08
Attending: ORTHOPAEDIC SURGERY | Admitting: FAMILY MEDICINE
Payer: MEDICARE

## 2022-12-04 ENCOUNTER — HOSPITAL ENCOUNTER (EMERGENCY)
Dept: CT IMAGING | Age: 87
Discharge: HOME | DRG: 481 | End: 2022-12-07
Payer: MEDICARE

## 2022-12-04 VITALS
HEART RATE: 82 BPM | TEMPERATURE: 98.2 F | RESPIRATION RATE: 18 BRPM | OXYGEN SATURATION: 95 % | SYSTOLIC BLOOD PRESSURE: 105 MMHG | DIASTOLIC BLOOD PRESSURE: 48 MMHG

## 2022-12-04 DIAGNOSIS — I50.22 CHRONIC SYSTOLIC CONGESTIVE HEART FAILURE (HCC): ICD-10-CM

## 2022-12-04 DIAGNOSIS — S72.001A CLOSED RIGHT HIP FRACTURE, INITIAL ENCOUNTER (HCC): Primary | ICD-10-CM

## 2022-12-04 DIAGNOSIS — I27.20 PULMONARY HYPERTENSION (HCC): ICD-10-CM

## 2022-12-04 DIAGNOSIS — R55 SYNCOPE AND COLLAPSE: ICD-10-CM

## 2022-12-04 DIAGNOSIS — Z95.0 PACEMAKER: ICD-10-CM

## 2022-12-04 DIAGNOSIS — G47.09 OTHER INSOMNIA: ICD-10-CM

## 2022-12-04 DIAGNOSIS — I50.9 CONGESTIVE HEART FAILURE, UNSPECIFIED HF CHRONICITY, UNSPECIFIED HEART FAILURE TYPE (HCC): ICD-10-CM

## 2022-12-04 DIAGNOSIS — S72.001A CLOSED FRACTURE OF RIGHT HIP, INITIAL ENCOUNTER (HCC): Primary | ICD-10-CM

## 2022-12-04 PROBLEM — R00.1 BRADYCARDIA: Status: ACTIVE | Noted: 2017-01-16

## 2022-12-04 LAB
ALBUMIN SERPL-MCNC: 2.9 G/DL (ref 3.2–4.6)
ALBUMIN/GLOB SERPL: 0.9 (ref 0.4–1.6)
ALP SERPL-CCNC: 74 U/L (ref 50–136)
ALT SERPL-CCNC: 18 U/L (ref 12–65)
ANION GAP SERPL CALC-SCNC: 7 MMOL/L (ref 2–11)
APPEARANCE UR: CLEAR
AST SERPL-CCNC: 19 U/L (ref 15–37)
BASOPHILS # BLD: 0.1 K/UL (ref 0–0.2)
BASOPHILS NFR BLD: 1 % (ref 0–2)
BILIRUB SERPL-MCNC: 0.7 MG/DL (ref 0.2–1.1)
BILIRUB UR QL: NEGATIVE
BUN SERPL-MCNC: 25 MG/DL (ref 8–23)
CALCIUM SERPL-MCNC: 9.1 MG/DL (ref 8.3–10.4)
CHLORIDE SERPL-SCNC: 98 MMOL/L (ref 101–110)
CO2 SERPL-SCNC: 31 MMOL/L (ref 21–32)
COLOR UR: NORMAL
CREAT SERPL-MCNC: 1.02 MG/DL (ref 0.6–1)
DIFFERENTIAL METHOD BLD: ABNORMAL
EOSINOPHIL # BLD: 0.1 K/UL (ref 0–0.8)
EOSINOPHIL NFR BLD: 1 % (ref 0.5–7.8)
ERYTHROCYTE [DISTWIDTH] IN BLOOD BY AUTOMATED COUNT: 15.7 % (ref 11.9–14.6)
FLUAV AG NPH QL IA: NEGATIVE
FLUBV AG NPH QL IA: NEGATIVE
GLOBULIN SER CALC-MCNC: 3.4 G/DL (ref 2.8–4.5)
GLUCOSE SERPL-MCNC: 153 MG/DL (ref 65–100)
GLUCOSE UR STRIP.AUTO-MCNC: NEGATIVE MG/DL
HCT VFR BLD AUTO: 28.4 % (ref 35.8–46.3)
HGB BLD-MCNC: 9 G/DL (ref 11.7–15.4)
HGB UR QL STRIP: NEGATIVE
IMM GRANULOCYTES # BLD AUTO: 0.1 K/UL (ref 0–0.5)
IMM GRANULOCYTES NFR BLD AUTO: 1 % (ref 0–5)
INR PPP: 1.2
KETONES UR QL STRIP.AUTO: NEGATIVE MG/DL
LEUKOCYTE ESTERASE UR QL STRIP.AUTO: NEGATIVE
LYMPHOCYTES # BLD: 1 K/UL (ref 0.5–4.6)
LYMPHOCYTES NFR BLD: 9 % (ref 13–44)
MCH RBC QN AUTO: 27.8 PG (ref 26.1–32.9)
MCHC RBC AUTO-ENTMCNC: 31.7 G/DL (ref 31.4–35)
MCV RBC AUTO: 87.7 FL (ref 82–102)
MONOCYTES # BLD: 0.9 K/UL (ref 0.1–1.3)
MONOCYTES NFR BLD: 8 % (ref 4–12)
NEUTS SEG # BLD: 8.7 K/UL (ref 1.7–8.2)
NEUTS SEG NFR BLD: 81 % (ref 43–78)
NITRITE UR QL STRIP.AUTO: NEGATIVE
NRBC # BLD: 0 K/UL (ref 0–0.2)
PH UR STRIP: 7.5 (ref 5–9)
PLATELET # BLD AUTO: 360 K/UL (ref 150–450)
PMV BLD AUTO: 9 FL (ref 9.4–12.3)
POTASSIUM SERPL-SCNC: 3.7 MMOL/L (ref 3.5–5.1)
PROT SERPL-MCNC: 6.3 G/DL (ref 6.3–8.2)
PROT UR STRIP-MCNC: NEGATIVE MG/DL
PROTHROMBIN TIME: 14.9 SEC (ref 12.6–14.3)
RBC # BLD AUTO: 3.24 M/UL (ref 4.05–5.2)
SARS-COV-2 RDRP RESP QL NAA+PROBE: NOT DETECTED
SODIUM SERPL-SCNC: 136 MMOL/L (ref 133–143)
SOURCE: NORMAL
SP GR UR REFRACTOMETRY: 1.02 (ref 1–1.02)
SPECIMEN SOURCE: NORMAL
TROPONIN I SERPL HS-MCNC: 15.5 PG/ML (ref 0–14)
UROBILINOGEN UR QL STRIP.AUTO: 0.2 EU/DL (ref 0.2–1)
WBC # BLD AUTO: 10.8 K/UL (ref 4.3–11.1)

## 2022-12-04 PROCEDURE — 73502 X-RAY EXAM HIP UNI 2-3 VIEWS: CPT

## 2022-12-04 PROCEDURE — 96374 THER/PROPH/DIAG INJ IV PUSH: CPT

## 2022-12-04 PROCEDURE — 6360000002 HC RX W HCPCS: Performed by: GENERAL PRACTICE

## 2022-12-04 PROCEDURE — 70450 CT HEAD/BRAIN W/O DYE: CPT

## 2022-12-04 PROCEDURE — 2500000003 HC RX 250 WO HCPCS: Performed by: FAMILY MEDICINE

## 2022-12-04 PROCEDURE — 6370000000 HC RX 637 (ALT 250 FOR IP): Performed by: FAMILY MEDICINE

## 2022-12-04 PROCEDURE — 80053 COMPREHEN METABOLIC PANEL: CPT

## 2022-12-04 PROCEDURE — 71045 X-RAY EXAM CHEST 1 VIEW: CPT

## 2022-12-04 PROCEDURE — 99285 EMERGENCY DEPT VISIT HI MDM: CPT

## 2022-12-04 PROCEDURE — 85025 COMPLETE CBC W/AUTO DIFF WBC: CPT

## 2022-12-04 PROCEDURE — 87635 SARS-COV-2 COVID-19 AMP PRB: CPT

## 2022-12-04 PROCEDURE — 87804 INFLUENZA ASSAY W/OPTIC: CPT

## 2022-12-04 PROCEDURE — 85610 PROTHROMBIN TIME: CPT

## 2022-12-04 PROCEDURE — 1100000000 HC RM PRIVATE

## 2022-12-04 PROCEDURE — 84484 ASSAY OF TROPONIN QUANT: CPT

## 2022-12-04 PROCEDURE — 6360000002 HC RX W HCPCS: Performed by: FAMILY MEDICINE

## 2022-12-04 PROCEDURE — 93005 ELECTROCARDIOGRAM TRACING: CPT | Performed by: GENERAL PRACTICE

## 2022-12-04 PROCEDURE — 51702 INSERT TEMP BLADDER CATH: CPT

## 2022-12-04 PROCEDURE — 81003 URINALYSIS AUTO W/O SCOPE: CPT

## 2022-12-04 PROCEDURE — 96375 TX/PRO/DX INJ NEW DRUG ADDON: CPT

## 2022-12-04 RX ORDER — POLYETHYLENE GLYCOL 3350 17 G/17G
17 POWDER, FOR SOLUTION ORAL DAILY PRN
Status: DISCONTINUED | OUTPATIENT
Start: 2022-12-04 | End: 2022-12-08 | Stop reason: HOSPADM

## 2022-12-04 RX ORDER — ONDANSETRON 4 MG/1
TABLET, FILM COATED ORAL
COMMUNITY
Start: 2022-11-28

## 2022-12-04 RX ORDER — ASPIRIN 81 MG/1
81 TABLET ORAL DAILY
Status: DISCONTINUED | OUTPATIENT
Start: 2022-12-05 | End: 2022-12-05

## 2022-12-04 RX ORDER — MORPHINE SULFATE 2 MG/ML
1 INJECTION, SOLUTION INTRAMUSCULAR; INTRAVENOUS
Status: DISCONTINUED | OUTPATIENT
Start: 2022-12-04 | End: 2022-12-04 | Stop reason: HOSPADM

## 2022-12-04 RX ORDER — LEVOTHYROXINE SODIUM 88 UG/1
88 TABLET ORAL
Status: DISCONTINUED | OUTPATIENT
Start: 2022-12-05 | End: 2022-12-04 | Stop reason: HOSPADM

## 2022-12-04 RX ORDER — ONDANSETRON 2 MG/ML
4 INJECTION INTRAMUSCULAR; INTRAVENOUS EVERY 6 HOURS PRN
Status: DISCONTINUED | OUTPATIENT
Start: 2022-12-04 | End: 2022-12-04 | Stop reason: HOSPADM

## 2022-12-04 RX ORDER — OXYCODONE HYDROCHLORIDE 5 MG/1
5 TABLET ORAL EVERY 4 HOURS PRN
Status: DISCONTINUED | OUTPATIENT
Start: 2022-12-04 | End: 2022-12-04 | Stop reason: HOSPADM

## 2022-12-04 RX ORDER — SODIUM CHLORIDE 9 MG/ML
INJECTION, SOLUTION INTRAVENOUS PRN
Status: DISCONTINUED | OUTPATIENT
Start: 2022-12-04 | End: 2022-12-05 | Stop reason: SDUPTHER

## 2022-12-04 RX ORDER — ACETAMINOPHEN 650 MG/1
650 SUPPOSITORY RECTAL EVERY 6 HOURS PRN
Status: DISCONTINUED | OUTPATIENT
Start: 2022-12-04 | End: 2022-12-04 | Stop reason: HOSPADM

## 2022-12-04 RX ORDER — ENOXAPARIN SODIUM 100 MG/ML
40 INJECTION SUBCUTANEOUS DAILY
Status: DISCONTINUED | OUTPATIENT
Start: 2022-12-04 | End: 2022-12-04 | Stop reason: HOSPADM

## 2022-12-04 RX ORDER — ONDANSETRON 2 MG/ML
4 INJECTION INTRAMUSCULAR; INTRAVENOUS ONCE
Status: COMPLETED | OUTPATIENT
Start: 2022-12-04 | End: 2022-12-04

## 2022-12-04 RX ORDER — ONDANSETRON 2 MG/ML
4 INJECTION INTRAMUSCULAR; INTRAVENOUS EVERY 6 HOURS PRN
Status: DISCONTINUED | OUTPATIENT
Start: 2022-12-04 | End: 2022-12-08 | Stop reason: HOSPADM

## 2022-12-04 RX ORDER — ACETAMINOPHEN 325 MG/1
650 TABLET ORAL EVERY 6 HOURS PRN
Status: DISCONTINUED | OUTPATIENT
Start: 2022-12-04 | End: 2022-12-04 | Stop reason: HOSPADM

## 2022-12-04 RX ORDER — ASPIRIN 81 MG/1
81 TABLET, CHEWABLE ORAL DAILY
Status: DISCONTINUED | OUTPATIENT
Start: 2022-12-04 | End: 2022-12-04 | Stop reason: HOSPADM

## 2022-12-04 RX ORDER — MORPHINE SULFATE 4 MG/ML
4 INJECTION, SOLUTION INTRAMUSCULAR; INTRAVENOUS
Status: COMPLETED | OUTPATIENT
Start: 2022-12-04 | End: 2022-12-04

## 2022-12-04 RX ORDER — TRAMADOL HYDROCHLORIDE 50 MG/1
50 TABLET ORAL EVERY 6 HOURS PRN
Status: DISCONTINUED | OUTPATIENT
Start: 2022-12-04 | End: 2022-12-05

## 2022-12-04 RX ORDER — METOPROLOL TARTRATE 50 MG/1
50 TABLET, FILM COATED ORAL 2 TIMES DAILY
Status: DISCONTINUED | OUTPATIENT
Start: 2022-12-04 | End: 2022-12-04 | Stop reason: HOSPADM

## 2022-12-04 RX ORDER — LEVOTHYROXINE SODIUM 88 UG/1
88 TABLET ORAL
Status: DISCONTINUED | OUTPATIENT
Start: 2022-12-05 | End: 2022-12-08 | Stop reason: HOSPADM

## 2022-12-04 RX ORDER — SODIUM CHLORIDE 0.9 % (FLUSH) 0.9 %
5-40 SYRINGE (ML) INJECTION EVERY 12 HOURS SCHEDULED
Status: DISCONTINUED | OUTPATIENT
Start: 2022-12-04 | End: 2022-12-05

## 2022-12-04 RX ORDER — METOPROLOL TARTRATE 50 MG/1
50 TABLET, FILM COATED ORAL 2 TIMES DAILY
Status: DISCONTINUED | OUTPATIENT
Start: 2022-12-04 | End: 2022-12-08 | Stop reason: HOSPADM

## 2022-12-04 RX ORDER — SODIUM CHLORIDE 0.9 % (FLUSH) 0.9 %
5-40 SYRINGE (ML) INJECTION EVERY 12 HOURS SCHEDULED
Status: DISCONTINUED | OUTPATIENT
Start: 2022-12-04 | End: 2022-12-04 | Stop reason: HOSPADM

## 2022-12-04 RX ORDER — POLYETHYLENE GLYCOL 3350 17 G/17G
17 POWDER, FOR SOLUTION ORAL DAILY PRN
Status: DISCONTINUED | OUTPATIENT
Start: 2022-12-04 | End: 2022-12-04 | Stop reason: HOSPADM

## 2022-12-04 RX ORDER — HYDROCHLOROTHIAZIDE 25 MG/1
TABLET ORAL
COMMUNITY
Start: 2022-11-29

## 2022-12-04 RX ORDER — SODIUM CHLORIDE 0.9 % (FLUSH) 0.9 %
5-40 SYRINGE (ML) INJECTION PRN
Status: DISCONTINUED | OUTPATIENT
Start: 2022-12-04 | End: 2022-12-04 | Stop reason: HOSPADM

## 2022-12-04 RX ORDER — MORPHINE SULFATE 2 MG/ML
1 INJECTION, SOLUTION INTRAMUSCULAR; INTRAVENOUS EVERY 4 HOURS PRN
Status: DISCONTINUED | OUTPATIENT
Start: 2022-12-04 | End: 2022-12-05

## 2022-12-04 RX ORDER — FAMOTIDINE 20 MG/1
20 TABLET, FILM COATED ORAL 2 TIMES DAILY
Status: DISCONTINUED | OUTPATIENT
Start: 2022-12-04 | End: 2022-12-04 | Stop reason: HOSPADM

## 2022-12-04 RX ORDER — HYDROCHLOROTHIAZIDE 25 MG/1
25 TABLET ORAL DAILY
Status: DISCONTINUED | OUTPATIENT
Start: 2022-12-05 | End: 2022-12-08 | Stop reason: HOSPADM

## 2022-12-04 RX ORDER — SODIUM CHLORIDE 0.9 % (FLUSH) 0.9 %
5-40 SYRINGE (ML) INJECTION PRN
Status: DISCONTINUED | OUTPATIENT
Start: 2022-12-04 | End: 2022-12-05

## 2022-12-04 RX ORDER — ONDANSETRON 4 MG/1
4 TABLET, ORALLY DISINTEGRATING ORAL EVERY 8 HOURS PRN
Status: DISCONTINUED | OUTPATIENT
Start: 2022-12-04 | End: 2022-12-08 | Stop reason: HOSPADM

## 2022-12-04 RX ORDER — ONDANSETRON 4 MG/1
4 TABLET, ORALLY DISINTEGRATING ORAL EVERY 8 HOURS PRN
Status: DISCONTINUED | OUTPATIENT
Start: 2022-12-04 | End: 2022-12-04 | Stop reason: HOSPADM

## 2022-12-04 RX ORDER — ACETAMINOPHEN 325 MG/1
TABLET ORAL
Status: ON HOLD | COMMUNITY
Start: 2022-11-29 | End: 2022-12-08 | Stop reason: HOSPADM

## 2022-12-04 RX ORDER — ACETAMINOPHEN 325 MG/1
650 TABLET ORAL EVERY 6 HOURS PRN
Status: DISCONTINUED | OUTPATIENT
Start: 2022-12-04 | End: 2022-12-05

## 2022-12-04 RX ORDER — ACETAMINOPHEN 650 MG/1
650 SUPPOSITORY RECTAL EVERY 6 HOURS PRN
Status: DISCONTINUED | OUTPATIENT
Start: 2022-12-04 | End: 2022-12-06 | Stop reason: SDUPTHER

## 2022-12-04 RX ORDER — TRAMADOL HYDROCHLORIDE 50 MG/1
TABLET ORAL
Status: ON HOLD | COMMUNITY
Start: 2022-11-27 | End: 2022-12-08 | Stop reason: HOSPADM

## 2022-12-04 RX ORDER — TEMAZEPAM 15 MG/1
15 CAPSULE ORAL NIGHTLY
Status: DISCONTINUED | OUTPATIENT
Start: 2022-12-04 | End: 2022-12-04 | Stop reason: HOSPADM

## 2022-12-04 RX ORDER — SODIUM CHLORIDE 9 MG/ML
INJECTION, SOLUTION INTRAVENOUS PRN
Status: DISCONTINUED | OUTPATIENT
Start: 2022-12-04 | End: 2022-12-04 | Stop reason: HOSPADM

## 2022-12-04 RX ADMIN — ACETAMINOPHEN 650 MG: 325 TABLET, FILM COATED ORAL at 17:19

## 2022-12-04 RX ADMIN — ONDANSETRON 4 MG: 4 TABLET, ORALLY DISINTEGRATING ORAL at 17:14

## 2022-12-04 RX ADMIN — TUBERCULIN PURIFIED PROTEIN DERIVATIVE 5 UNITS: 5 INJECTION, SOLUTION INTRADERMAL at 17:19

## 2022-12-04 RX ADMIN — MORPHINE SULFATE 1 MG: 2 INJECTION, SOLUTION INTRAMUSCULAR; INTRAVENOUS at 20:38

## 2022-12-04 RX ADMIN — MORPHINE SULFATE 4 MG: 4 INJECTION INTRAVENOUS at 06:54

## 2022-12-04 RX ADMIN — ONDANSETRON 4 MG: 2 INJECTION INTRAMUSCULAR; INTRAVENOUS at 06:53

## 2022-12-04 ASSESSMENT — PAIN SCALES - GENERAL
PAINLEVEL_OUTOF10: 5
PAINLEVEL_OUTOF10: 3
PAINLEVEL_OUTOF10: 5
PAINLEVEL_OUTOF10: 9
PAINLEVEL_OUTOF10: 2
PAINLEVEL_OUTOF10: 10

## 2022-12-04 ASSESSMENT — PAIN DESCRIPTION - DESCRIPTORS: DESCRIPTORS: SPASM

## 2022-12-04 ASSESSMENT — PAIN - FUNCTIONAL ASSESSMENT: PAIN_FUNCTIONAL_ASSESSMENT: ADULT NONVERBAL PAIN SCALE (NPVS)

## 2022-12-04 ASSESSMENT — PAIN DESCRIPTION - ORIENTATION: ORIENTATION: RIGHT

## 2022-12-04 ASSESSMENT — PAIN DESCRIPTION - LOCATION: LOCATION: HIP

## 2022-12-04 NOTE — ACP (ADVANCE CARE PLANNING)
[facility]  At the heart of better care     Advance Care Planning   Admit Date:  2022  5:16 AM   Name:  Rina Hernandez   Age:  80 y.o. Sex:  female  :  3/11/1926   MRN:  [id number]   Room:  [facility] 26 is able to make her own decisions:   Yes, patient alert and oriented x3     If pt unable to make decisions, POA/surrogate decision maker:  Son/POA Norma Cornelio    Other people present:   Daughter in law [address] confirmed patient is a DNR/DNI. Patient or surrogate consented to discussion of the current conditions, workup, management plans, prognosis, and the risk for further deterioration. Time spent: 17 minutes in direct discussion. Signed:   Lori Beatty DO

## 2022-12-04 NOTE — ED PROVIDER NOTES
Emergency Department Provider Note                   PCP:                Marichuy Ward MD               Age: 80 y.o. Sex: female     No diagnosis found. DISPOSITION          MDM  Number of Diagnoses or Management Options  Closed fracture of right hip, initial encounter [facility]): new, needed workup  Syncope and collapse: new, needed workup  Diagnosis management comments: Patient is found to have a right intertrochanteric fracture. Despite her being on hospice the son would still like her to be considered for surgery as she does have high functioning and is ambulatory at baseline. I have discussed the case with the hospitalist who has accepted her at [facility].  I have also reached out to orthopedic surgeon. Patient will also need further work-up regarding the syncope but there is no clear emergent etiology at this time.        Amount and/or Complexity of Data Reviewed  Clinical lab tests: ordered and reviewed  Tests in the radiology section of CPT®: ordered and reviewed  Tests in the medicine section of CPT®: ordered and reviewed  Discussion of test results with the performing providers: no  Decide to obtain previous medical records or to obtain history from someone other than the patient: no  Obtain history from someone other than the patient: no  Review and summarize past medical records: no  Discuss the patient with other providers: yes  Independent visualization of images, tracings, or specimens: yes    Risk of Complications, Morbidity, and/or Mortality  Presenting problems: moderate  Diagnostic procedures: moderate  Management options: moderate    Patient Progress  Patient progress: stable             Orders Placed This Encounter   Procedures    CT Head W/O Contrast    XR CHEST PORTABLE    XR HIP 2-3 VW W PELVIS RIGHT    CMP    CBC with Auto Differential    Protime-INR    Troponin    Urinalysis    Do Not Resuscitate    EKG 12 Lead        Medications   ondansetron (ZOFRAN) injection 4 mg (has no administration in time range)   morphine sulfate (PF) injection 4 mg (has no administration in time range)       New Prescriptions    No medications on file        Meghan Adam is a 80 y.o. female who presents to the Emergency Department with chief complaint of    Chief Complaint   Patient presents with    Nausea    Emesis      Patient presents with a syncopal event and fall. Patient is complaining of right hip pain since the fall. Patient does walk. Unclear about the syncopal event. It was not observed. It is also unclear if the patient hit her head. Patient is also been having some vomiting which they have been treating with Zofran. Patient is normally pretty sharp mentally and the son says there are no deficits there but says that she does appear to be mildly confused as he is seeing her in the emergency department. Patient is at an assisted living facility and is on hospice for severe CHF and edema. Patient is a DNR. Patient has weeping edema and open wounds in the bilateral lower extremities and history of cellulitis in the legs. Son does not think she has had any fevers.         Review of Systems   Unable to perform ROS: Acuity of condition     Past Medical History:   Diagnosis Date    Anxiety 5/11/2016    Arthritis     Bradycardia     pacemaker placed 1/2017    CHF (congestive heart failure) ([address]     Colles' fracture 03/2017    HTN (hypertension)     Hypothyroidism     Pacemaker 01/19/2017    Biotronik    Pulmonary hypertension ([address] 05/11/2016        Past Surgical History:   Procedure Laterality Date    APPENDECTOMY      CARPAL TUNNEL RELEASE Left     HX OPEN REDUCTION INTERNAL FIXATION Left 03/2017    wrist/Colles fx    OTHER SURGICAL HISTORY      ear surg on R ear    PACEMAKER PLACEMENT  01/2017    TONSILLECTOMY AND ADENOIDECTOMY      TOTAL ABDOMINAL HYSTERECTOMY W/ BILATERAL SALPINGOOPHORECTOMY      TOTAL KNEE ARTHROPLASTY Left         Family History   Problem Relation Age of Onset Cancer Brother         brain tumor    No Known Problems Mother     No Known Problems Father     Cancer Sister         breast CA        Social History     Socioeconomic History    Marital status:    Tobacco Use    Smoking status: Never    Smokeless tobacco: Never   Substance and Sexual Activity    Alcohol use: No    Drug use: No         Meperidine, Morphine, Sulfa antibiotics, Propoxyphene, Amlodipine, and Penicillins     Previous Medications    ASPIRIN LOW DOSE 81 MG EC TABLET        ELIQUIS 2.5 MG TABS TABLET    TAKE 1 TABLET BY MOUTH TWO TIMES A DAY. LEVOTHYROXINE (SYNTHROID) 88 MCG TABLET    TAKE 1 TABLET BY MOUTH ONCE DAILY    METOPROLOL TARTRATE (LOPRESSOR) 50 MG TABLET    TAKE 1 TABLET BY MOUTH TWO TIMES A DAY. POTASSIUM CHLORIDE (KLOR-CON M) 10 MEQ EXTENDED RELEASE TABLET        SENNA (SENOKOT) 8.6 MG TABS TABLET        TEMAZEPAM (RESTORIL) 15 MG CAPSULE    1 po qhs    TORSEMIDE (DEMADEX) 20 MG TABLET        TRIAMCINOLONE (KENALOG) 0.025 % CREAM            Vitals signs and nursing note reviewed. Patient Vitals for the past 4 hrs:   Pulse Resp BP SpO2   12/04/22 0535 -- -- (!) 174/94 --   12/04/22 0530 82 11 (!) 181/92 97 %          Physical Exam  Constitutional:       General: She is not in acute distress. Appearance: She is ill-appearing. HENT:      Head: Normocephalic and atraumatic. Nose: Nose normal.      Mouth/Throat:      Mouth: Mucous membranes are moist.   Eyes:      Extraocular Movements: Extraocular movements intact. Pupils: Pupils are equal, round, and reactive to light. Cardiovascular:      Rate and Rhythm: Normal rate and regular rhythm. Heart sounds: Normal heart sounds. Pulmonary:      Effort: No respiratory distress. Breath sounds: No wheezing. Abdominal:      General: Abdomen is flat. Palpations: Abdomen is soft. Tenderness: There is no abdominal tenderness. Musculoskeletal:         General: Tenderness (Right hip) present.  No swelling. Cervical back: Normal range of motion. Comments: Right lower extremity externally rotated and shortened. Severe pain in the right hip with internal and external rotation   Skin:     Findings: No erythema or rash. Neurological:      General: No focal deficit present. Comments: No focal deficits, patient does awaken by voice. She seems pleasantly confused   Psychiatric:         Mood and Affect: Mood normal.         Behavior: Behavior normal.        EKG 12 Lead    Date/Time: 12/4/2022 6:49 AM  Performed by: Dennis Hsu DO  Authorized by: Dennis Hsu DO     ECG reviewed by ED Physician in the absence of a cardiologist: yes    Interpretation:     Interpretation: abnormal    Rate:     ECG rate:  79    ECG rate assessment: normal    Rhythm:     Rhythm: sinus rhythm    QRS:     QRS conduction: LBBB    ST segments:     ST segments:  Normal    No results found for any visits on 12/04/22. No orders to display                       Voice dictation software was used during the making of this note. This software is not perfect and grammatical and other typographical errors may be present. This note has not been completely proofread for errors.      Dennis Hsu DO  12/04/22 1889

## 2022-12-04 NOTE — ED NOTES
TRANSFER - OUT REPORT:    Verbal report given to Tommy Giron  being transferred to [id number] for routine progression of patient care       Report consisted of patient's Situation, Background, Assessment and   Recommendations(SBAR). Information from the following report(s) ED Encounter Summary was reviewed with the receiving nurse. Lines:   Peripheral IV 12/04/22 Left;Proximal Forearm (Active)        Opportunity for questions and clarification was provided.       Patient transported with:  Registered Nurse      Cholo Adames RN  12/04/22 5432

## 2022-12-04 NOTE — H&P
Hospitalist History and Physical   Admit Date:  2022  5:16 AM   Name:  Taina Cheng   Age:  80 y.o. Sex:  female  :  3/11/1926   MRN:  [id number]   Room:      Presenting Complaint: Nausea and Emesis     Reason(s) for Admission: Syncope, unspecified syncope type [R55]     History of Present Illness:   Taina Cheng is a 80 y.o. female with medical history of chronic CHF, PAF/SSS with pacemaker, pulmonary hypertension in place who presented from [address] [place] [address] with right hip pain. Patient was alert and oriented x3 admission and stated that night of 12/3, she walked to the kitchen, opened door of refrigerator to get water but door was not opening or closing right. Next thing she knew, she fell to the floor. She denies ever having LOC or hit her head. She was awake and alert. She denies having dizziness, lightheadedness, SOB, chest pain prior to fall. Denies any shaking episodes. She use an alert button to notify staff. Staff at facility discussed event with patient's son, due to her history of frequent falls without any sustained injury, she was brought back to bed and observed. However, she woke up at 430 and staff noticed she started having episode of N/V with lethargy, therefore EMS was called. When son came to see patient at facility, she was alert and oriented x3 and told him that she might have broken her hip. Of note, patient was under hospice care for her significant cardiac history along with chronic wounds. On admission, patient denies fever, chills, SOB, chest pain, abdominal pain, nausea, vomiting, diarrhea. In ED, Hgb 9.0, CR 1.02. UA unremarkable. XR hip shows acute, comminuted, displaced right hip intertrochanteric fracture CT head shows no acute findings. CXR shows no acute findings. Patient was given morphine, Zofran in ED.  ED physician was informed by house supervisor that patient will need to be admitted here at [address] due to lack of bed availability at [facility]. Plan will be to transfer the patient directly to the OR in a.m. 12/5 to repair for hip fracture per orthopedics plan. Review of Systems:  10 systems reviewed and negative except as noted in HPI. Assessment & Plan:     R hip fracture  Frequent falls  XR hip shows acute, comminuted, displaced right hip intertrochanteric fracture CT head shows no acute findings. CXR shows no acute findings. Fall precautions  Bedrest for now  Leach placed  DVT PPx per Ortho  PT/OT after Ortho eval  Analgesics and antiemetics prn  NPO after midnight  Monitor CBC and BMP  Consult Ortho, appreciate recs    ? Syncope  Chronic CHF  Persistent A. Fib /SSS  Pacemaker in place  Pulmonary hypertension  HTN  Patient DENIES syncope on admission. Pt of [facility] cardiology Dr. aJson Vargas. No longer takes Eliquis at home, only ASA. No longer takes Demadex at home, only HCTZ. Patient previously on hospice for severe CHF prior to admission. Last TTE in epic was in 7/7/2015 with EF 62%  Continue home ASA  Continue home HCTZ, metoprolol  Obtain TTE  Consult cardiology for cardiac clearance, appreciate recs    Normocytic anemia  Hgb 9.0 on admission (baseline~10-11). Most likely due to right hip fracture, ? hematoma and surrounding area was edematous. Check iron studies in a.m. CTM CBC, transfuse if Hgb <8 due to cardiac comorbidities or if symptomatic    Insomnia  Continue home Restoril    Hypothyroidism  Continue home Synthroid    Chronic bilateral lower extremity wounds  Consult wound team        Anticipated discharge needs:     >2 midnights    Diet: No diet orders on file  VTE ppx: Lovenox SQ  Code status: DNR    Hospital Problems:  Principal Problem:    Syncope, unspecified syncope type  Resolved Problems:    * No resolved hospital problems.  *       Past History:     Past Medical History:   Diagnosis Date    Anxiety 5/11/2016    Arthritis     Bradycardia     pacemaker placed 1/2017    CH[facility] ([facility]estive heart failure) [address]     Colles' fracture 03/2017    HTN (hypertension)     Hypothyroidism     Pacemaker 01/19/2017    Biotronik    Pulmonary hypertension ([facility] 05/11/2016       Past Surgical History:   Procedure Laterality Date    APPENDECTOMY      CARPAL TUNNEL RELEASE Left     HX OPEN REDUCTION INTERNAL FIXATION Left 03/2017    wrist/Colles fx    OTHER SURGICAL HISTORY      ear surg on R ear    PACEMAKER PLACEMENT  01/2017    TONSILLECTOMY AND ADENOIDECTOMY      TOTAL ABDOMINAL HYSTERECTOMY W/ BILATERAL SALPINGOOPHORECTOMY      TOTAL KNEE ARTHROPLASTY Left         Social History     Tobacco Use    Smoking status: Never    Smokeless tobacco: Never   Substance Use Topics    Alcohol use: No      Social History     Substance and Sexual Activity   Drug Use No       Family History   Problem Relation Age of Onset    Cancer Brother         brain tumor    No Known Problems Mother     No Known Problems Father     Cancer Sister         breast CA        Immunization History   Administered Date(s) Administered    Influenza, High Dose (Fluzone 65 yrs and older) 10/28/2015, 11/01/2016, 10/31/2017, 10/12/2018    Pneumococcal Conjugate 13-valent ([id number]) 06/05/2017     Allergies   Allergen Reactions    Meperidine Nausea And Vomiting    Morphine Nausea And Vomiting    Sulfa Antibiotics Rash    Propoxyphene Other (See Comments)     Unknown reaction    Amlodipine Rash    Penicillins Rash     Prior to Admit Medications:  Current Outpatient Medications   Medication Instructions    acetaminophen (TYLENOL) 325 MG tablet No dose, route, or frequency recorded. ASPIRIN LOW DOSE 81 MG EC tablet No dose, route, or frequency recorded. ELIQUIS 2.5 MG TABS tablet TAKE 1 TABLET BY MOUTH TWO TIMES A DAY. hydroCHLOROthiazide (HYDRODIURIL) 25 MG tablet No dose, route, or frequency recorded.     levothyroxine (SYNTHROID) 88 MCG tablet TAKE 1 TABLET BY MOUTH ONCE DAILY    metoprolol tartrate (LOPRESSOR) 50 MG tablet TAKE 1 TABLET BY MOUTH TWO TIMES A DAY. ondansetron (ZOFRAN) 4 MG tablet No dose, route, or frequency recorded. potassium chloride (KLOR-CON M) 10 MEQ extended release tablet No dose, route, or frequency recorded. senna (SENOKOT) 8.6 MG TABS tablet No dose, route, or frequency recorded. temazepam (RESTORIL) 15 MG capsule 1 po qhs    torsemide (DEMADEX) 20 MG tablet No dose, route, or frequency recorded. traMADol (ULTRAM) 50 MG tablet No dose, route, or frequency recorded. triamcinolone (KENALOG) 0.025 % cream No dose, route, or frequency recorded. Objective:   Patient Vitals for the past 24 hrs:   Pulse Resp BP SpO2   12/04/22 0535 -- -- (!) 174/94 --   12/04/22 0530 82 11 (!) 181/92 97 %       Oxygen Therapy  SpO2: 97 %    Estimated body mass index is 22.02 kg/m² as calculated from the following:    Height as of 4/28/22: 4' 11\" (1.499 m). Weight as of 4/28/22: 109 lb (49.4 kg). No intake or output data in the 24 hours ending 12/04/22 1158      Physical Exam:    Blood pressure (!) 174/94, pulse 82, resp. rate 11, SpO2 97 %. General:    Frail appearing. Uncomfortable due to pain  Head:  Normocephalic, atraumatic  Eyes:  Sclerae appear normal.  Pupils equally round. ENT:  Nares appear normal, no drainage. Moist oral mucosa  Neck:  No restricted ROM. Trachea midline   CV:   RRR. No m/r/g. No jugular venous distension. Lungs:   CTAB. No wheezing, rhonchi, or rales. Symmetric expansion. Abdomen: Bowel sounds present. Soft, nontender, nondistended. Extremities: R hip externally rotated and shortened  Skin:     Bilateral lower extremity wounds that were weeping. See pictures below  Neuro:  CN II-XII grossly intact. Sensation intact. A&Ox3  Psych:  Normal mood and affect.                   I have personally reviewed labs and tests showing:  Recent Labs:  Recent Results (from the past 24 hour(s))   CMP    Collection Time: 12/04/22  6:44 AM   Result Value Ref Range    Sodium 136 133 - 143 mmol/L Potassium 3.7 3.5 - 5.1 mmol/L    Chloride 98 (L) 101 - 110 mmol/L    CO2 31 21 - 32 mmol/L    Anion Gap 7 2 - 11 mmol/L    Glucose 153 (H) 65 - 100 mg/dL    BUN 25 (H) 8 - 23 MG/DL    Creatinine 1.02 (H) 0.6 - 1.0 MG/DL    Est, Glom Filt Rate 50 (L) >60 ml/min/1.73m2    Calcium 9.1 8.3 - 10.4 MG/DL    Total Bilirubin 0.7 0.2 - 1.1 MG/DL    ALT 18 12 - 65 U/L    AST 19 15 - 37 U/L    Alk Phosphatase 74 50 - 136 U/L    Total Protein 6.3 6.3 - 8.2 g/dL    Albumin 2.9 (L) 3.2 - 4.6 g/dL    Globulin 3.4 2.8 - 4.5 g/dL    Albumin/Globulin Ratio 0.9 0.4 - 1.6     CBC with Auto Differential    Collection Time: 12/04/22  6:44 AM   Result Value Ref Range    WBC 10.8 4.3 - 11.1 K/uL    RBC 3.24 (L) 4.05 - 5.2 M/uL    Hemoglobin 9.0 (L) 11.7 - 15.4 g/dL    Hematocrit 28.4 (L) 35.8 - 46.3 %    MCV 87.7 82.0 - 102.0 FL    MCH 27.8 26.1 - 32.9 PG    MCHC 31.7 31.4 - 35.0 g/dL    RDW 15.7 (H) 11.9 - 14.6 %    Platelets 002 616 - 321 K/uL    MPV 9.0 (L) 9.4 - 12.3 FL    nRBC 0.00 0.0 - 0.2 K/uL    Differential Type AUTOMATED      Seg Neutrophils 81 (H) 43 - 78 %    Lymphocytes 9 (L) 13 - 44 %    Monocytes 8 4.0 - 12.0 %    Eosinophils % 1 0.5 - 7.8 %    Basophils 1 0.0 - 2.0 %    Immature Granulocytes 1 0.0 - 5.0 %    Segs Absolute 8.7 (H) 1.7 - 8.2 K/UL    Absolute Lymph # 1.0 0.5 - 4.6 K/UL    Absolute Mono # 0.9 0.1 - 1.3 K/UL    Absolute Eos # 0.1 0.0 - 0.8 K/UL    Basophils Absolute 0.1 0.0 - 0.2 K/UL    Absolute Immature Granulocyte 0.1 0.0 - 0.5 K/UL   Protime-INR    Collection Time: 12/04/22  6:44 AM   Result Value Ref Range    Protime 14.9 (H) 12.6 - 14.3 sec    INR 1.2     Troponin    Collection Time: 12/04/22  6:44 AM   Result Value Ref Range    Troponin, High Sensitivity 15.5 (H) 0 - 14 pg/mL   Urinalysis    Collection Time: 12/04/22  7:02 AM   Result Value Ref Range    Color, UA YELLOW/STRAW      Appearance CLEAR      Specific Gravity, UA 1.017 1.001 - 1.023      pH, Urine 7.5 5.0 - 9.0      Protein, UA Negative NEG mg/dL    Glucose, UA Negative mg/dL    Ketones, Urine Negative NEG mg/dL    Bilirubin Urine Negative NEG      Blood, Urine Negative NEG      Urobilinogen, Urine 0.2 0.2 - 1.0 EU/dL    Nitrite, Urine Negative NEG      Leukocyte Esterase, Urine Negative NEG         I have personally reviewed imaging studies showing:  CT Head W/O Contrast    Result Date: 12/4/2022  EXAM: CT HEAD WITHOUT CONTRAST INDICATION: trauma. COMPARISON: None. TECHNIQUE: Contiguous axial images were obtained from the skull base through the vertex without IV contrast. Radiation dose reduction techniques were used for this study. Our CT scanners use one or all of the following: Automated exposure control, adjustment of the mA and/or kV according to patient size, iterative reconstruction. FINDINGS: Global parenchymal volume loss and ex vacuo ventriculomegaly. Periventricular white matter hypoattenuation reflects sequelae of small vessel ischemic disease. No acute infarction or hemorrhage. No hydrocephalus or midline shift. No extra-axial mass or hemorrhage. The basal cisterns are patent. The visualized portions of the orbits are normal. The mastoid air cells and paranasal sinuses are patent. The visualized vascular structures have an unremarkable noncontrast appearance. The calvarium and soft tissues appear normal.     No acute intracranial abnormality. 1     XR CHEST PORTABLE    Result Date: 12/4/2022  CHEST X-RAY, 1 VIEW INDICATION: Syncope COMPARISON: None available TECHNIQUE: Single AP view of the chest was obtained. FINDINGS: Lungs and pleural spaces: Normal. No pneumothorax or pleural effusion. Cardiomediastinal silhouette: Dual-chamber cardiac pacemaker. Cardiac silhouette is enlarged. Aortic atherosclerotic calcifications. Osseous structures: Degenerative changes of the spine and shoulders. No radiographic evidence of acute cardiopulmonary disease.      XR HIP 2-3 VW W PELVIS RIGHT    Result Date: 12/4/2022  PELVIS AND RIGHT HIP RADIOGRAPHS INDICATION: injury COMPARISON: None available TECHNIQUE: A single view of the pelvis and 2 views of the right hip were obtained. FINDINGS: The bones are demineralized. Acute, comminuted right hip intertrochanteric fracture with displacement and varus deformity. No pelvic fracture clearly seen. The sacroiliac joints appear symmetric. Acute, comminuted, displaced right hip intertrochanteric fracture. Echocardiogram:  No results found for this or any previous visit. Orders Placed This Encounter   Medications    ondansetron (ZOFRAN) injection 4 mg    morphine sulfate (PF) injection 4 mg         Signed: Devin Pillai DO    Part of this note may have been written by using a voice dictation software. The note has been proof read but may still contain some grammatical/other typographical errors.

## 2022-12-04 NOTE — ED PROVIDER NOTES
Informed by house supervisor that patient will need to be admitted here at the [address] facility today due [facility]  Plan would be to transfer the patient directly to the OR tomorrow for repair of her hip fracture per orthopedics plan    I will consult [address] hospitalist service to admit the patient this morning.      Halley Harper MD  12/04/22 8025

## 2022-12-04 NOTE — ED NOTES
Dr. Nam Jeffrey has finished evaluating pt at this time and RN will wrap wounds on legs and take pt to room. WHITNEY Sanders has been notified of care.       Anastacio Cheng RN  12/04/22 4995

## 2022-12-05 ENCOUNTER — ANESTHESIA (OUTPATIENT)
Dept: SURGERY | Age: 87
End: 2022-12-05
Payer: MEDICARE

## 2022-12-05 ENCOUNTER — APPOINTMENT (OUTPATIENT)
Dept: GENERAL RADIOLOGY | Age: 87
DRG: 481 | End: 2022-12-05
Attending: ORTHOPAEDIC SURGERY
Payer: MEDICARE

## 2022-12-05 ENCOUNTER — ANESTHESIA EVENT (OUTPATIENT)
Dept: SURGERY | Age: 87
End: 2022-12-05
Payer: MEDICARE

## 2022-12-05 LAB
ANION GAP SERPL CALC-SCNC: 3 MMOL/L (ref 2–11)
BASOPHILS # BLD: 0 K/UL (ref 0–0.2)
BASOPHILS NFR BLD: 1 % (ref 0–2)
BUN SERPL-MCNC: 27 MG/DL (ref 8–23)
CALCIUM SERPL-MCNC: 8.8 MG/DL (ref 8.3–10.4)
CHLORIDE SERPL-SCNC: 100 MMOL/L (ref 101–110)
CO2 SERPL-SCNC: 31 MMOL/L (ref 21–32)
CREAT SERPL-MCNC: 1 MG/DL (ref 0.6–1)
DIFFERENTIAL METHOD BLD: ABNORMAL
EOSINOPHIL # BLD: 0 K/UL (ref 0–0.8)
EOSINOPHIL NFR BLD: 0 % (ref 0.5–7.8)
ERYTHROCYTE [DISTWIDTH] IN BLOOD BY AUTOMATED COUNT: 16 % (ref 11.9–14.6)
GLUCOSE SERPL-MCNC: 130 MG/DL (ref 65–100)
HCT VFR BLD AUTO: 23.5 % (ref 35.8–46.3)
HGB BLD-MCNC: 7.4 G/DL (ref 11.7–15.4)
HISTORY CHECK: NORMAL
IMM GRANULOCYTES # BLD AUTO: 0 K/UL (ref 0–0.5)
IMM GRANULOCYTES NFR BLD AUTO: 0 % (ref 0–5)
LYMPHOCYTES # BLD: 1.2 K/UL (ref 0.5–4.6)
LYMPHOCYTES NFR BLD: 14 % (ref 13–44)
MCH RBC QN AUTO: 28.1 PG (ref 26.1–32.9)
MCHC RBC AUTO-ENTMCNC: 31.5 G/DL (ref 31.4–35)
MCV RBC AUTO: 89.4 FL (ref 82–102)
MM INDURATION, POC: 0 MM (ref 0–5)
MONOCYTES # BLD: 0.9 K/UL (ref 0.1–1.3)
MONOCYTES NFR BLD: 11 % (ref 4–12)
NEUTS SEG # BLD: 6.2 K/UL (ref 1.7–8.2)
NEUTS SEG NFR BLD: 74 % (ref 43–78)
NRBC # BLD: 0 K/UL (ref 0–0.2)
PLATELET # BLD AUTO: 320 K/UL (ref 150–450)
PMV BLD AUTO: 9.3 FL (ref 9.4–12.3)
POTASSIUM SERPL-SCNC: 3.7 MMOL/L (ref 3.5–5.1)
PPD, POC: NEGATIVE
RBC # BLD AUTO: 2.63 M/UL (ref 4.05–5.2)
SODIUM SERPL-SCNC: 134 MMOL/L (ref 133–143)
WBC # BLD AUTO: 8.3 K/UL (ref 4.3–11.1)

## 2022-12-05 PROCEDURE — 7100000000 HC PACU RECOVERY - FIRST 15 MIN: Performed by: ORTHOPAEDIC SURGERY

## 2022-12-05 PROCEDURE — 6360000002 HC RX W HCPCS: Performed by: ANESTHESIOLOGY

## 2022-12-05 PROCEDURE — 6370000000 HC RX 637 (ALT 250 FOR IP): Performed by: NURSE PRACTITIONER

## 2022-12-05 PROCEDURE — 36415 COLL VENOUS BLD VENIPUNCTURE: CPT

## 2022-12-05 PROCEDURE — 6360000002 HC RX W HCPCS: Performed by: FAMILY MEDICINE

## 2022-12-05 PROCEDURE — 73552 X-RAY EXAM OF FEMUR 2/>: CPT

## 2022-12-05 PROCEDURE — 2709999900 HC NON-CHARGEABLE SUPPLY: Performed by: ORTHOPAEDIC SURGERY

## 2022-12-05 PROCEDURE — P9016 RBC LEUKOCYTES REDUCED: HCPCS

## 2022-12-05 PROCEDURE — 3700000001 HC ADD 15 MINUTES (ANESTHESIA): Performed by: ORTHOPAEDIC SURGERY

## 2022-12-05 PROCEDURE — 86923 COMPATIBILITY TEST ELECTRIC: CPT

## 2022-12-05 PROCEDURE — 80048 BASIC METABOLIC PNL TOTAL CA: CPT

## 2022-12-05 PROCEDURE — 7100000001 HC PACU RECOVERY - ADDTL 15 MIN: Performed by: ORTHOPAEDIC SURGERY

## 2022-12-05 PROCEDURE — 86901 BLOOD TYPING SEROLOGIC RH(D): CPT

## 2022-12-05 PROCEDURE — 6360000002 HC RX W HCPCS: Performed by: ORTHOPAEDIC SURGERY

## 2022-12-05 PROCEDURE — C1769 GUIDE WIRE: HCPCS | Performed by: ORTHOPAEDIC SURGERY

## 2022-12-05 PROCEDURE — 3600000004 HC SURGERY LEVEL 4 BASE: Performed by: ORTHOPAEDIC SURGERY

## 2022-12-05 PROCEDURE — 3600000014 HC SURGERY LEVEL 4 ADDTL 15MIN: Performed by: ORTHOPAEDIC SURGERY

## 2022-12-05 PROCEDURE — 2580000003 HC RX 258: Performed by: ANESTHESIOLOGY

## 2022-12-05 PROCEDURE — 2500000003 HC RX 250 WO HCPCS: Performed by: NURSE ANESTHETIST, CERTIFIED REGISTERED

## 2022-12-05 PROCEDURE — 6360000002 HC RX W HCPCS: Performed by: NURSE ANESTHETIST, CERTIFIED REGISTERED

## 2022-12-05 PROCEDURE — 2500000003 HC RX 250 WO HCPCS: Performed by: ORTHOPAEDIC SURGERY

## 2022-12-05 PROCEDURE — 85025 COMPLETE CBC W/AUTO DIFF WBC: CPT

## 2022-12-05 PROCEDURE — 2720000010 HC SURG SUPPLY STERILE: Performed by: ORTHOPAEDIC SURGERY

## 2022-12-05 PROCEDURE — 2580000003 HC RX 258: Performed by: ORTHOPAEDIC SURGERY

## 2022-12-05 PROCEDURE — 2580000003 HC RX 258: Performed by: FAMILY MEDICINE

## 2022-12-05 PROCEDURE — 1100000000 HC RM PRIVATE

## 2022-12-05 PROCEDURE — C1713 ANCHOR/SCREW BN/BN,TIS/BN: HCPCS | Performed by: ORTHOPAEDIC SURGERY

## 2022-12-05 PROCEDURE — 6370000000 HC RX 637 (ALT 250 FOR IP): Performed by: ORTHOPAEDIC SURGERY

## 2022-12-05 PROCEDURE — 3700000000 HC ANESTHESIA ATTENDED CARE: Performed by: ORTHOPAEDIC SURGERY

## 2022-12-05 PROCEDURE — 0QS606Z REPOSITION RIGHT UPPER FEMUR WITH INTRAMEDULLARY INTERNAL FIXATION DEVICE, OPEN APPROACH: ICD-10-PCS | Performed by: ORTHOPAEDIC SURGERY

## 2022-12-05 PROCEDURE — A4217 STERILE WATER/SALINE, 500 ML: HCPCS | Performed by: ORTHOPAEDIC SURGERY

## 2022-12-05 PROCEDURE — 2580000003 HC RX 258: Performed by: NURSE ANESTHETIST, CERTIFIED REGISTERED

## 2022-12-05 PROCEDURE — 6370000000 HC RX 637 (ALT 250 FOR IP): Performed by: FAMILY MEDICINE

## 2022-12-05 DEVICE — IMPLANTABLE DEVICE: Type: IMPLANTABLE DEVICE | Site: HIP | Status: FUNCTIONAL

## 2022-12-05 DEVICE — NAIL IM L400MM DIA11MM 130DEG LNG R PROX FEM GRN TI CANN: Type: IMPLANTABLE DEVICE | Site: HIP | Status: FUNCTIONAL

## 2022-12-05 RX ORDER — FENTANYL CITRATE 50 UG/ML
25 INJECTION, SOLUTION INTRAMUSCULAR; INTRAVENOUS EVERY 5 MIN PRN
Status: DISCONTINUED | OUTPATIENT
Start: 2022-12-05 | End: 2022-12-05 | Stop reason: HOSPADM

## 2022-12-05 RX ORDER — ONDANSETRON 2 MG/ML
4 INJECTION INTRAMUSCULAR; INTRAVENOUS
Status: COMPLETED | OUTPATIENT
Start: 2022-12-05 | End: 2022-12-05

## 2022-12-05 RX ORDER — FENTANYL CITRATE 50 UG/ML
25 INJECTION, SOLUTION INTRAMUSCULAR; INTRAVENOUS EVERY 10 MIN PRN
Status: COMPLETED | OUTPATIENT
Start: 2022-12-05 | End: 2022-12-05

## 2022-12-05 RX ORDER — EPHEDRINE SULFATE/0.9% NACL/PF 50 MG/5 ML
SYRINGE (ML) INTRAVENOUS PRN
Status: DISCONTINUED | OUTPATIENT
Start: 2022-12-05 | End: 2022-12-05 | Stop reason: SDUPTHER

## 2022-12-05 RX ORDER — HYDROMORPHONE HYDROCHLORIDE 1 MG/ML
0.5 INJECTION, SOLUTION INTRAMUSCULAR; INTRAVENOUS; SUBCUTANEOUS
Status: DISCONTINUED | OUTPATIENT
Start: 2022-12-05 | End: 2022-12-08 | Stop reason: HOSPADM

## 2022-12-05 RX ORDER — ASPIRIN 81 MG/1
81 TABLET ORAL 2 TIMES DAILY
Status: DISCONTINUED | OUTPATIENT
Start: 2022-12-05 | End: 2022-12-08 | Stop reason: HOSPADM

## 2022-12-05 RX ORDER — ONDANSETRON 2 MG/ML
INJECTION INTRAMUSCULAR; INTRAVENOUS PRN
Status: DISCONTINUED | OUTPATIENT
Start: 2022-12-05 | End: 2022-12-05 | Stop reason: SDUPTHER

## 2022-12-05 RX ORDER — SODIUM CHLORIDE 9 MG/ML
INJECTION, SOLUTION INTRAVENOUS PRN
Status: DISCONTINUED | OUTPATIENT
Start: 2022-12-05 | End: 2022-12-08 | Stop reason: HOSPADM

## 2022-12-05 RX ORDER — DEXAMETHASONE SODIUM PHOSPHATE 10 MG/ML
INJECTION INTRAMUSCULAR; INTRAVENOUS PRN
Status: DISCONTINUED | OUTPATIENT
Start: 2022-12-05 | End: 2022-12-05 | Stop reason: SDUPTHER

## 2022-12-05 RX ORDER — LIDOCAINE HYDROCHLORIDE 20 MG/ML
INJECTION, SOLUTION EPIDURAL; INFILTRATION; INTRACAUDAL; PERINEURAL PRN
Status: DISCONTINUED | OUTPATIENT
Start: 2022-12-05 | End: 2022-12-05 | Stop reason: SDUPTHER

## 2022-12-05 RX ORDER — TEMAZEPAM 15 MG/1
15 CAPSULE ORAL NIGHTLY PRN
Status: DISCONTINUED | OUTPATIENT
Start: 2022-12-05 | End: 2022-12-08 | Stop reason: HOSPADM

## 2022-12-05 RX ORDER — SODIUM CHLORIDE, SODIUM LACTATE, POTASSIUM CHLORIDE, CALCIUM CHLORIDE 600; 310; 30; 20 MG/100ML; MG/100ML; MG/100ML; MG/100ML
INJECTION, SOLUTION INTRAVENOUS CONTINUOUS PRN
Status: DISCONTINUED | OUTPATIENT
Start: 2022-12-05 | End: 2022-12-05 | Stop reason: SDUPTHER

## 2022-12-05 RX ORDER — ETOMIDATE 2 MG/ML
INJECTION INTRAVENOUS PRN
Status: DISCONTINUED | OUTPATIENT
Start: 2022-12-05 | End: 2022-12-05 | Stop reason: SDUPTHER

## 2022-12-05 RX ORDER — SODIUM CHLORIDE 0.9 % (FLUSH) 0.9 %
5-40 SYRINGE (ML) INJECTION EVERY 12 HOURS SCHEDULED
Status: DISCONTINUED | OUTPATIENT
Start: 2022-12-05 | End: 2022-12-08 | Stop reason: HOSPADM

## 2022-12-05 RX ORDER — SODIUM CHLORIDE 9 MG/ML
INJECTION, SOLUTION INTRAVENOUS CONTINUOUS
Status: DISCONTINUED | OUTPATIENT
Start: 2022-12-05 | End: 2022-12-06

## 2022-12-05 RX ORDER — FENTANYL CITRATE 50 UG/ML
INJECTION, SOLUTION INTRAMUSCULAR; INTRAVENOUS PRN
Status: DISCONTINUED | OUTPATIENT
Start: 2022-12-05 | End: 2022-12-05 | Stop reason: SDUPTHER

## 2022-12-05 RX ORDER — ACETAMINOPHEN 325 MG/1
650 TABLET ORAL EVERY 4 HOURS PRN
Status: DISCONTINUED | OUTPATIENT
Start: 2022-12-05 | End: 2022-12-06 | Stop reason: SDUPTHER

## 2022-12-05 RX ORDER — SODIUM CHLORIDE 9 MG/ML
INJECTION, SOLUTION INTRAVENOUS PRN
Status: COMPLETED | OUTPATIENT
Start: 2022-12-05 | End: 2022-12-05

## 2022-12-05 RX ORDER — MAGNESIUM HYDROXIDE 1200 MG/15ML
LIQUID ORAL CONTINUOUS PRN
Status: DISCONTINUED | OUTPATIENT
Start: 2022-12-05 | End: 2022-12-05 | Stop reason: HOSPADM

## 2022-12-05 RX ORDER — SODIUM CHLORIDE 0.9 % (FLUSH) 0.9 %
5-40 SYRINGE (ML) INJECTION PRN
Status: DISCONTINUED | OUTPATIENT
Start: 2022-12-05 | End: 2022-12-08 | Stop reason: HOSPADM

## 2022-12-05 RX ADMIN — Medication 2000 MG: at 00:11

## 2022-12-05 RX ADMIN — MORPHINE SULFATE 1 MG: 2 INJECTION, SOLUTION INTRAMUSCULAR; INTRAVENOUS at 05:00

## 2022-12-05 RX ADMIN — Medication 2 G: at 07:42

## 2022-12-05 RX ADMIN — METOPROLOL TARTRATE 50 MG: 50 TABLET, FILM COATED ORAL at 21:27

## 2022-12-05 RX ADMIN — Medication 5 MG: at 08:20

## 2022-12-05 RX ADMIN — HYDROMORPHONE HYDROCHLORIDE 0.5 MG: 1 INJECTION, SOLUTION INTRAMUSCULAR; INTRAVENOUS; SUBCUTANEOUS at 11:32

## 2022-12-05 RX ADMIN — ONDANSETRON 4 MG: 2 INJECTION INTRAMUSCULAR; INTRAVENOUS at 07:51

## 2022-12-05 RX ADMIN — SODIUM CHLORIDE, PRESERVATIVE FREE 10 ML: 5 INJECTION INTRAVENOUS at 11:36

## 2022-12-05 RX ADMIN — SODIUM CHLORIDE: 9 INJECTION, SOLUTION INTRAVENOUS at 07:56

## 2022-12-05 RX ADMIN — TEMAZEPAM 15 MG: 15 CAPSULE ORAL at 21:27

## 2022-12-05 RX ADMIN — FENTANYL CITRATE 25 MCG: 50 INJECTION, SOLUTION INTRAMUSCULAR; INTRAVENOUS at 09:25

## 2022-12-05 RX ADMIN — CEFAZOLIN 1000 MG: 1 INJECTION, POWDER, FOR SOLUTION INTRAMUSCULAR; INTRAVENOUS at 23:35

## 2022-12-05 RX ADMIN — ONDANSETRON 4 MG: 2 INJECTION INTRAMUSCULAR; INTRAVENOUS at 09:15

## 2022-12-05 RX ADMIN — CEFAZOLIN 1000 MG: 1 INJECTION, POWDER, FOR SOLUTION INTRAMUSCULAR; INTRAVENOUS at 17:08

## 2022-12-05 RX ADMIN — METOPROLOL TARTRATE 50 MG: 50 TABLET, FILM COATED ORAL at 00:11

## 2022-12-05 RX ADMIN — SODIUM CHLORIDE, SODIUM LACTATE, POTASSIUM CHLORIDE, AND CALCIUM CHLORIDE: 600; 310; 30; 20 INJECTION, SOLUTION INTRAVENOUS at 07:21

## 2022-12-05 RX ADMIN — SODIUM CHLORIDE, PRESERVATIVE FREE 10 ML: 5 INJECTION INTRAVENOUS at 21:27

## 2022-12-05 RX ADMIN — ETOMIDATE 16 MG: 2 INJECTION, SOLUTION INTRAVENOUS at 07:35

## 2022-12-05 RX ADMIN — LIDOCAINE HYDROCHLORIDE 60 MG: 20 INJECTION, SOLUTION EPIDURAL; INFILTRATION; INTRACAUDAL; PERINEURAL at 07:35

## 2022-12-05 RX ADMIN — DEXAMETHASONE SODIUM PHOSPHATE 8 MG: 10 INJECTION INTRAMUSCULAR; INTRAVENOUS at 07:51

## 2022-12-05 RX ADMIN — SODIUM CHLORIDE, PRESERVATIVE FREE 10 ML: 5 INJECTION INTRAVENOUS at 00:11

## 2022-12-05 RX ADMIN — FENTANYL CITRATE 25 MCG: 50 INJECTION, SOLUTION INTRAMUSCULAR; INTRAVENOUS at 09:15

## 2022-12-05 RX ADMIN — LEVOTHYROXINE SODIUM 88 MCG: 0.09 TABLET ORAL at 05:00

## 2022-12-05 RX ADMIN — FENTANYL CITRATE 25 MCG: 50 INJECTION, SOLUTION INTRAMUSCULAR; INTRAVENOUS at 08:08

## 2022-12-05 RX ADMIN — ACETAMINOPHEN 650 MG: 325 TABLET ORAL at 21:27

## 2022-12-05 RX ADMIN — FENTANYL CITRATE 12.5 MCG: 50 INJECTION, SOLUTION INTRAMUSCULAR; INTRAVENOUS at 07:35

## 2022-12-05 RX ADMIN — ASPIRIN 81 MG: 81 TABLET ORAL at 15:17

## 2022-12-05 RX ADMIN — ASPIRIN 81 MG: 81 TABLET ORAL at 21:27

## 2022-12-05 RX ADMIN — ONDANSETRON 4 MG: 2 INJECTION INTRAMUSCULAR; INTRAVENOUS at 05:00

## 2022-12-05 RX ADMIN — FENTANYL CITRATE 12.5 MCG: 50 INJECTION, SOLUTION INTRAMUSCULAR; INTRAVENOUS at 07:37

## 2022-12-05 RX ADMIN — ONDANSETRON 4 MG: 2 INJECTION INTRAMUSCULAR; INTRAVENOUS at 11:34

## 2022-12-05 RX ADMIN — HYDROCHLOROTHIAZIDE 25 MG: 25 TABLET ORAL at 15:18

## 2022-12-05 ASSESSMENT — PAIN DESCRIPTION - DESCRIPTORS
DESCRIPTORS: ACHING

## 2022-12-05 ASSESSMENT — PAIN DESCRIPTION - FREQUENCY
FREQUENCY: INTERMITTENT
FREQUENCY: INTERMITTENT

## 2022-12-05 ASSESSMENT — PAIN SCALES - GENERAL
PAINLEVEL_OUTOF10: 3
PAINLEVEL_OUTOF10: 9
PAINLEVEL_OUTOF10: 0
PAINLEVEL_OUTOF10: 2
PAINLEVEL_OUTOF10: 3
PAINLEVEL_OUTOF10: 6
PAINLEVEL_OUTOF10: 7
PAINLEVEL_OUTOF10: 0

## 2022-12-05 ASSESSMENT — PAIN - FUNCTIONAL ASSESSMENT
PAIN_FUNCTIONAL_ASSESSMENT: PREVENTS OR INTERFERES SOME ACTIVE ACTIVITIES AND ADLS
PAIN_FUNCTIONAL_ASSESSMENT: PREVENTS OR INTERFERES SOME ACTIVE ACTIVITIES AND ADLS
PAIN_FUNCTIONAL_ASSESSMENT: NONE - DENIES PAIN
PAIN_FUNCTIONAL_ASSESSMENT: 0-10

## 2022-12-05 ASSESSMENT — PAIN DESCRIPTION - PAIN TYPE
TYPE: ACUTE PAIN
TYPE: SURGICAL PAIN

## 2022-12-05 ASSESSMENT — PAIN DESCRIPTION - LOCATION
LOCATION: HIP

## 2022-12-05 ASSESSMENT — PAIN DESCRIPTION - ORIENTATION
ORIENTATION: RIGHT

## 2022-12-05 ASSESSMENT — PAIN DESCRIPTION - ONSET
ONSET: GRADUAL
ONSET: GRADUAL

## 2022-12-05 NOTE — CARE COORDINATION
Call received from [facility] staff to assist with clarifying attending provider on patient. Per review of chart, appears that attending at [facility] was Dr. Jonnie Mann with hospitalist group and Dr. Temi Corral was accepting physician at [facility]. [facility] staff will follow up with Dr. Temi Corral as H&P is needed.

## 2022-12-05 NOTE — PLAN OF CARE
Problem: Discharge Planning  Goal: Discharge to home or other facility with appropriate resources  Outcome: Progressing  Flowsheets (Taken 12/4/2022 2201)  Discharge to home or other facility with appropriate resources: Identify barriers to discharge with patient and caregiver     Problem: Pain  Goal: Verbalizes/displays adequate comfort level or baseline comfort level  Outcome: Progressing  Flowsheets (Taken 12/4/2022 2207)  Verbalizes/displays adequate comfort level or baseline comfort level:   Encourage patient to monitor pain and request assistance   Assess pain using appropriate pain scale     Problem: Skin/Tissue Integrity  Goal: Absence of new skin breakdown  Description: 1. Monitor for areas of redness and/or skin breakdown  2. Assess vascular access sites hourly  3. Every 4-6 hours minimum:  Change oxygen saturation probe site  4. Every 4-6 hours:  If on nasal continuous positive airway pressure, respiratory therapy assess nares and determine need for appliance change or resting period.   Outcome: Progressing     Problem: Safety - Adult  Goal: Free from fall injury  Outcome: Progressing     Problem: ABCDS Injury Assessment  Goal: Absence of physical injury  Outcome: Progressing

## 2022-12-05 NOTE — H&P
History and Physical Updated with no interval change. Edgar Workman MD History and Physical Updated with no interval change.  Edgar Workman MD

## 2022-12-05 NOTE — PLAN OF CARE
Problem: Discharge Planning  Goal: Discharge to home or other facility with appropriate resources  12/5/2022 1241 by Pooja Cagle RN  Outcome: Progressing  12/4/2022 2332 by Yady Bailey RN  Outcome: Progressing  Flowsheets (Taken 12/4/2022 2201)  Discharge to home or other facility with appropriate resources: Identify barriers to discharge with patient and caregiver     Problem: Pain  Goal: Verbalizes/displays adequate comfort level or baseline comfort level  12/5/2022 1241 by Pooja Cagle RN  Outcome: Progressing  12/4/2022 2332 by Yady Bailey RN  Outcome: Progressing  Flowsheets (Taken 12/4/2022 2207)  Verbalizes/displays adequate comfort level or baseline comfort level:   Encourage patient to monitor pain and request assistance   Assess pain using appropriate pain scale     Problem: Skin/Tissue Integrity  Goal: Absence of new skin breakdown  Description: 1. Monitor for areas of redness and/or skin breakdown  2. Assess vascular access sites hourly  3. Every 4-6 hours minimum:  Change oxygen saturation probe site  4. Every 4-6 hours:  If on nasal continuous positive airway pressure, respiratory therapy assess nares and determine need for appliance change or resting period.   12/5/2022 1241 by Pooja Cagle RN  Outcome: Progressing  12/4/2022 2332 by Yady Bailey RN  Outcome: Progressing     Problem: Safety - Adult  Goal: Free from fall injury  12/5/2022 1241 by Pooja Cagle RN  Outcome: Progressing  12/4/2022 2332 by Yady Bailey RN  Outcome: Progressing     Problem: ABCDS Injury Assessment  Goal: Absence of physical injury  12/5/2022 1241 by Pooja Cagle RN  Outcome: Progressing  12/4/2022 2332 by Yady Bailey RN  Outcome: Progressing     Problem: Chronic Conditions and Co-morbidities  Goal: Patient's chronic conditions and co-morbidity symptoms are monitored and maintained or improved  Outcome: Progressing

## 2022-12-05 NOTE — PROGRESS NOTES
Pt /48, this nurse notified MD of this blood pressure. According to pt flowsheets, BP had been elevated prior to this reading. No new orders received this time.

## 2022-12-05 NOTE — ANESTHESIA PROCEDURE NOTES
Airway  Date/Time: 12/5/2022 7:37 AM  Urgency: elective      General Information and Staff    Patient location during procedure: OR  Anesthesiologist: Todd Mathews MD  Resident/CRNA: GERARDO Lehman - CRNA    Indications and Patient Condition  Indications for airway management: anesthesia  Spontaneous Ventilation: absent  Sedation level: deep  Preoxygenated: yes  Patient position: sniffing  MILS not maintained throughout  Mask difficulty assessment: vent by bag mask    Final Airway Details  Final airway type: supraglottic airway      Successful airway: intubating  Size 4     Number of attempts at approach: 1  Ventilation between attempts: bag mask  Number of other approaches attempted: 0    no

## 2022-12-05 NOTE — ANESTHESIA PRE PROCEDURE
Department of Anesthesiology  Preprocedure Note       Name:  Taina Cheng   Age:  80 y.o.  :  3/11/1926                                          MRN:  [id number]         Date:  2022      Surgeon: Shaw Anthony):  MD Destinee Freedman MD    Procedure: Procedure(s):  HIP PINNING, Right TFN    Medications prior to admission:   Prior to Admission medications    Medication Sig Start Date End Date Taking? Authorizing Provider   hydroCHLOROthiazide (HYDRODIURIL) 25 MG tablet  22   Historical Provider, MD   acetaminophen (TYLENOL) 325 MG tablet  22   Historical Provider, MD   ondansetron (ZOFRAN) 4 MG tablet  22   Historical Provider, MD   traMADol (ULTRAM) 50 MG tablet  22   Historical Provider, MD   ELIQUIS 2.5 MG TABS tablet TAKE 1 TABLET BY MOUTH TWO TIMES A DAY.   Patient not taking: No sig reported 22   Danette Myles MD   ASPIRIN LOW DOSE 81 MG EC tablet  10/18/22   Historical Provider, MD   potassium chloride (KLOR-CON M) 10 MEQ extended release tablet  10/18/22   Historical Provider, MD   senna (SENOKOT) 8.6 MG TABS tablet  10/7/22   Historical Provider, MD   torsemide (DEMADEX) 20 MG tablet  10/25/22   Historical Provider, MD   triamcinolone (KENALOG) 0.025 % cream  10/20/22   Historical Provider, MD   metoprolol tartrate (LOPRESSOR) 50 MG tablet TAKE 1 TABLET BY MOUTH TWO TIMES A DAY. 22   Danette Myles MD   levothyroxine (SYNTHROID) 88 MCG tablet TAKE 1 TABLET BY MOUTH ONCE DAILY 19   Ar Automatic Reconciliation   temazepam (RESTORIL) 15 MG capsule 1 po qhs 19   Ar Automatic Reconciliation       Current medications:    Current Facility-Administered Medications   Medication Dose Route Frequency Provider Last Rate Last Admin    0.9 % sodium chloride infusion   IntraVENous PRN Leatha Darby IV, MD        ceFAZolin (ANCEF) 2000 mg in sterile water 20 mL IV syringe  2,000 mg IntraVENous Q8H Kavita Lofton MD   2,000 mg at 22 0011    levothyroxine (SYNTHROID) tablet 88 mcg  88 mcg Oral QAM AC Feliciano Tiwari MD   88 mcg at 12/05/22 0500    metoprolol tartrate (LOPRESSOR) tablet 50 mg  50 mg Oral BID Feliciano Tiwari MD   50 mg at 12/05/22 0011    aspirin EC tablet 81 mg  81 mg Oral Daily Feliciano Tiwari MD        hydroCHLOROthiazide (HYDRODIURIL) tablet 25 mg  25 mg Oral Daily Feliciano Tiwari MD        traMADol Diane Backer) tablet 50 mg  50 mg Oral Q6H PRN Feliciano Tiwari MD        sodium chloride flush 0.9 % injection 5-40 mL  5-40 mL IntraVENous 2 times per day Feliciano Tiwari MD   10 mL at 12/05/22 0011    sodium chloride flush 0.9 % injection 5-40 mL  5-40 mL IntraVENous PRN Feliciano Tiwari MD        0.9 % sodium chloride infusion   IntraVENous PRN Feliciano Tiwari MD        ondansetron (ZOFRAN-ODT) disintegrating tablet 4 mg  4 mg Oral Q8H PRN Feliciano Tiwari MD        Or    ondansetron [facility]) injection 4 mg  4 mg IntraVENous Q6H PRN Feliciano Tiwari MD   4 mg at 12/05/22 0500    polyethylene glycol (GLYCOLAX) packet 17 g  17 g Oral Daily PRN Feliciano Tiwari MD        acetaminophen (TYLENOL) tablet 650 mg  650 mg Oral Q6H PRN Feliciano Tiwari MD        Or    acetaminophen (TYLENOL) suppository 650 mg  650 mg Rectal Q6H PRN Feliciano Tiwari MD        morphine injection 1 mg  1 mg IntraVENous Q4H PRN Feliciano Tiwari MD   1 mg at 12/05/22 0500       Allergies: Allergies   Allergen Reactions    Meperidine Nausea And Vomiting    Oxycodone Itching and Nausea And Vomiting     Pt family does not want this as an option for pain management.      Sulfa Antibiotics Rash    Propoxyphene Other (See Comments)     Unknown reaction    Amlodipine Rash    Codeine Nausea And Vomiting    Penicillins Rash       Problem List:    Patient Active Problem List   Diagnosis Code    Anxiety F41.9    Hypothyroidism E03.9    Palpitations R00.2    HTN (hypertension) I10    CHF (congestive heart failure) (HCC) I50.9    Pruritic rash L28.2  Edema R60.9    Insomnia G47.00    Pacemaker Z95.0    Long-term use of high-risk medication Z79.899    Bradycardia R00.1    Pulmonary hypertension (HCC) I27.20    [facility] S72.001A       Past Medical History:        Diagnosis Date    Anxiety 5/11/2016    Arthritis     Bradycardia     pacemaker placed 1/2017    [facility]est[facility]     Colles' fracture 03/2017    HTN (hypertension)     Hypothyroidism     Pacemaker 01/19/2017    Biotronik    Pulmonary hypertension ([facility] 05/11/2016       Past Surgical History:        Procedure Laterality Date    APPENDECTOMY      CARPAL TUNNEL RELEASE Left     HX OPEN REDUCTION INTERNAL FIXATION Left 03/2017    wrist/Colles fx    OTHER SURGICAL HISTORY      ear surg on R ear    PACEMAKER PLACEMENT  01/2017    TONSILLECTOMY AND ADENOIDECTOMY      TOTAL ABDOMINAL HYSTERECTOMY W/ BILATERAL SALPINGOOPHORECTOMY      TOTAL KNEE ARTHROPLASTY Left        Social History:    Social History     Tobacco Use    Smoking status: Never    Smokeless tobacco: Never   Substance Use Topics    Alcohol use:  No                                Counseling given: Not Answered      Vital Signs (Current):   Vitals:    12/04/22 2207 12/05/22 0011 12/05/22 0401 12/05/22 0606   BP: 130/65 133/66 (!) 124/59 (!) 144/63   Pulse: 76 75 79 80   Resp: 18 18 18 12   Temp: 98.2 °F (36.8 °C) 97.7 °F (36.5 °C) 98.2 °F (36.8 °C) 97.9 °F (36.6 °C)   TempSrc: Oral Oral Oral Oral   SpO2: 94%  98% 96%   Weight:    102 lb (46.3 kg)   Height:    4' 11\" (1.499 m)                                              BP Readings from Last 3 Encounters:   12/05/22 (!) 144/63   12/04/22 (!) 105/48   04/28/22 (!) 150/94       NPO Status: Time of last liquid consumption: 0000                        Time of last solid consumption: 0000                        Date of last liquid consumption: 12/04/22                        Date of last solid food consumption: 12/04/22    BMI:   Wt Readings from Last 3 Encounters:   12/05/22 102 lb (46.3 kg)   04/28/22 109 lb (49.4 kg)   10/22/21 100 lb (45.4 kg)     Body mass index is 20.6 kg/m². CBC:   Lab Results   Component Value Date/Time    WBC 8.3 12/05/2022 05:29 AM    RBC 2.63 12/05/2022 05:29 AM    HGB 7.4 12/05/2022 05:29 AM    HCT 23.5 12/05/2022 05:29 AM    MCV 89.4 12/05/2022 05:29 AM    RDW 16.0 12/05/2022 05:29 AM     12/05/2022 05:29 AM       CMP:   Lab Results   Component Value Date/Time     12/05/2022 05:29 AM    K 3.7 12/05/2022 05:29 AM     12/05/2022 05:29 AM    CO2 31 12/05/2022 05:29 AM    BUN 27 12/05/2022 05:29 AM    CREATININE 1.00 12/05/2022 05:29 AM    LABGLOM 52 12/05/2022 05:29 AM    GLUCOSE 130 12/05/2022 05:29 AM    PROT 6.3 12/04/2022 06:44 AM    CALCIUM 8.8 12/05/2022 05:29 AM    BILITOT 0.7 12/04/2022 06:44 AM    ALKPHOS 74 12/04/2022 06:44 AM    AST 19 12/04/2022 06:44 AM    ALT 18 12/04/2022 06:44 AM       POC Tests: No results for input(s): POCGLU, POCNA, POCK, POCCL, POCBUN, POCHEMO, POCHCT in the last 72 hours.     Coags:   Lab Results   Component Value Date/Time    PROTIME 14.9 12/04/2022 06:44 AM    INR 1.2 12/04/2022 06:44 AM       HCG (If Applicable): No results found for: PREGTESTUR, PREGSERUM, HCG, HCGQUANT     ABGs: No results found for: PHART, PO2ART, QGE9OYL, BWP3JXE, BEART, E5XNNQJI     Type & Screen (If Applicable):  No results found for: LABABO, LABRH    Drug/Infectious Status (If Applicable):  No results found for: HIV, HEPCAB    COVID-19 Screening (If Applicable):   Lab Results   Component Value Date/Time    COVID19 Not detected 12/04/2022 02:49 PM           Anesthesia Evaluation  Patient summary reviewed and Nursing notes reviewed  Airway: Mallampati: II  TM distance: <3 FB   Neck ROM: limited  Comment: Looks anterior  Mouth opening: < 3 FB   Dental: normal exam         Pulmonary: breath sounds clear to auscultation Cardiovascular:  Exercise tolerance: Does ADLs at her assisted living facility, frequent falls  (+) hypertension:, pacemaker:, CHF: diastolic, murmur: Grade 2, Aortic, pulmonary hypertension:,         Rhythm: regular  Rate: normal                 ROS comment: Hospice care for CHF, last echo in care everywhere in 2015 with normal LVEF and grade 2 DD, AV sclerosis without stenosis     Neuro/Psych:   Negative Neuro/Psych ROS              GI/Hepatic/Renal: Neg GI/Hepatic/Renal ROS            Endo/Other:    (+) hypothyroidism::., .                 Abdominal:             Vascular: negative vascular ROS. Other Findings:           Anesthesia Plan      general     ASA 4 - emergent       Induction: intravenous. Anesthetic plan and risks discussed with patient.                         Gilmer Arreola MD   12/5/2022

## 2022-12-05 NOTE — BRIEF OP NOTE
Brief Postoperative Note      Patient: Collin Sheldon  YOB: 1926  MRN: [id number]    Date of Procedure: 12/5/2022    Pre-Op Diagnosis: Closed fracture of right hip requiring operative repair with routine healing, subsequent encounter [S72.001D], IT fx    Post-Op Diagnosis: Same       Procedure(s):  OPEN REDUCTION INTERNAL FIXATION RIGHT HIP FRACTURE WITH TROCHANTERIC FIXATION  NAIL    Surgeon(s):  Emory Pagan MD    Assistant:  * No surgical staff found *    Anesthesia: General    Estimated Blood Loss (mL): 75 ml    Complications: None    Specimens:   * No specimens in log *    Implants:  Implant Name Type Inv. Item Serial No.  Lot No. LRB No. Used Action   NAIL IM L400MM [id number] 130DEG LNG R PROX FEM GRN TI [employer] - [id number]  NAIL IM L400MM [id number] 130DEG LNG R PROX FEM GRN TI [employer] [id number] Right 1 Implanted   BLADE IM L105MM DIA10.35MM PROX FEM G TI JASS FEN JUAN LUIS FOR - [id number]  BLADE IM L105MM DIA10.35MM PROX FEM G TI [employer] FEN JUAN LUIS FOR  [employer]WD [id number] Right 1 Implanted         Drains:   Urinary Catheter 12/04/22 Leach (Active)   $ Urethral catheter insertion $ Not inserted for procedure 12/04/22 1912   Catheter Indications Prolonged immobilization (e.g. unstable thoracic or lumbar spine, multiple traumatic injuries such as pelvic fractures) 12/04/22 2201   Site Assessment No urethral drainage 12/04/22 2201   Urine Color Yellow 12/04/22 2201   Urine Appearance Clear 12/04/22 2201   Collection Container Standard 12/04/22 2201   Securement Method Securing device (Describe) 12/04/22 2201   Catheter Best Practices  Drainage tube clipped to bed;Catheter secured to thigh; Tamper seal intact; Bag below bladder;Bag not on floor; Lack of dependent loop in tubing;Drainage bag less than half full 12/04/22 2201   Status Patent;Draining 12/04/22 2201   Output (mL) 175 mL 12/05/22 0401       Findings: as above    Electronically signed by Arpit Balderas MD on 12/5/2022 at 8:35 AM

## 2022-12-05 NOTE — PROGRESS NOTES
TRANSFER - IN REPORT:    Verbal report received from Markus Sniff, [place] on Taina Cheng  being received from  ED for routine progression of patient care      Report consisted of patient's Situation, Background, Assessment and   Recommendations(SBAR). Information from the following report(s) Nurse Handoff Report was reviewed with the receiving nurse. Opportunity for questions and clarification was provided. Assessment completed upon patient's arrival to unit and care assumed.

## 2022-12-05 NOTE — PROGRESS NOTES
TRANSFER - OUT REPORT:    Verbal report given to [facility], RN on Temi Nunez  being transferred to Pre-Op for routine progression of patient care       Report consisted of patient's Situation, Background, Assessment and   Recommendations(SBAR). Information from the following report(s) Nurse Handoff Report was reviewed with the receiving nurse. [place] Assessment: Presents to emergency department  because of falls (Syncope, seizure, or loss of consciousness): Yes, Age > 79: Yes, Altered Mental Status, Intoxication with alcohol or substance confusion (Disorientation, impaired judgment, poor safety awaremess, or inability to follow instructions): No, Impaired Mobility: Ambulates or transfers with assistive devices or assistance; Unable to ambulate or transer.: Yes  Lines:   Peripheral IV 12/04/22 Left;Proximal Forearm (Active)   Site Assessment Clean, dry & intact 12/04/22 2201   Line Status Flushed;Capped 12/04/22 2201   Line Care Connections checked and tightened;Cap changed 12/04/22 2201   Phlebitis Assessment No symptoms 12/04/22 2201   Infiltration Assessment 0 12/04/22 2201   Alcohol Cap Used Yes 12/04/22 2201   Dressing Status Clean, dry & intact 12/04/22 2201   Dressing Type Transparent 12/04/22 2201        Opportunity for questions and clarification was provided.

## 2022-12-05 NOTE — OP NOTE
[facility]  OPERATIVE REPORT    Name:  Fatimah Fung  MR#:  [id number]  :  1926  ACCOUNT #:  [de-identified]  DATE OF SERVICE:  2022      PREOPERATIVE DIAGNOSIS:  Right intertrochanteric proximal femur fracture. POSTOPERATIVE DIAGNOSIS:  Right intertrochanteric proximal femur fracture. PROCEDURE:  Right trochanteric fixation nail Synthes. We utilized a 400 mm length 11-mm diameter 103 degree neck angle with a 105 lag screw device. SURGEON:  Constance Singer MD    ASSISTANCE:  None. ANESTHESIA:  General.    DRAINS:  None. SPECIMENS:  None. ESTIMATED BLOOD LOSS:  75 mL    IMPLANTS:  see above    COMPLICATIONS:  none noted    BRIEF HISTORY:  66-year-old female resident of a local assisted living facility. She fell there yesterday, was admitted in the morning to the hospitalist service, was transferred from the [address] to [facility]. I was able to evaluate the patient and speak to her son who is her medical power of . Discussed different treatment options. The patient is currently on hospice but was very mentally alert and very functional utilizing a walker and unassisted to go down to meals with her assisted living facility. So we discussed these options with the son surgery versus non-surgery. We recommend intramedullary nailing. He understands risks and complications to procedure to include nonunion, malunion, infection, blood clots, blood loss, nerve and tendon damage, other less common but potentially serious complications may occur. Informed consent was obtained. PROCEDURE IN DETAIL:  The patient was seen in the preoperative area. Chart was updated. Her leg was marked. Her permit was signed. She was taken to the operating room #9. Successful general anesthetic was achieved. She took 2 g of Ancef perioperatively.   The right hip was prepped and draped in a sterile field after closed reduction was effective utilizing C-arm video fluoroscopy in the [place] fracture table. Once the time-out was done to confirm the operative site, the laterality, potentially complicating factors to satisfaction of operative team members, we proceeded with a 2.5 cm incision to the tip the trochanter which was localized utilizing C-arm video fluoroscopy. We placed a guide pin directed into the tip of the trochanter and passed this distally under C-arm video fluoroscopy guidance. We over reamed the guide wire using a 15.5-mm hand awl. Once this done, then placed a ball-tip guide down towards the knee. We checked our position in both AP and lateral planes. We measured our length. We chose a 400-mm nail. We then reamed up to 13 mm with good chatter at 13 mm. We placed in a 400 mm x 11 mm x 103-degree angle nail without complication down the intramedullary canal of the right femur. Once we did this and localize this under C-arm video fluoroscopy. We then placed the outrigger guide onto the nail. We then made a stab wound more distal on the thigh approximately 1/2 cm in length and carried blunt dissection down to the lateral aspect of the femur. We placed in the guide sleeve. Once it was satisfactory position we placed in a guide pin. We then measured this at 105 mm. We then overdrilled this, placed in our guide pin without complication. We then placed in the derotational portion of this and then unlocked this so that she can compress at the fracture site. Appeared to have good fixation so was felt that distal locking was not necessary. We irrigated the two wounds and we did check the x-rays in AP and lateral plain at the knee to the mid thigh and the hip and found all hardware is in satisfactory position and the fracture being reduced anatomically. We then irrigated these two wounds, closed deeply with 0 Vicryl, 2-0 Vicryl and clips on the skin. EBL was approximately 75 mL.   She tolerated the procedure well and will be admitted back to the hospitalist service with ortho consulting.       MD CLARISSA [email or web address]  D:  12/05/2022 8:52  T:  12/05/2022 10:12  JOB #:  [id number]

## 2022-12-05 NOTE — PERIOP NOTE
TRANSFER - OUT REPORT:    Verbal report given to Sushila Ramirez RN on Leah Rangel  being transferred to [facility] [id number] for routine post-op       Report consisted of patients Situation, Background, Assessment and   Recommendations(SBAR). Information from the following report(s) Index, Surgery Report, MAR, and Cardiac Rhythm Paced  was reviewed with the receiving nurse. Lines:   Peripheral IV 12/04/22 Left;Proximal Forearm (Active)   Site Assessment Clean, dry & intact 12/05/22 0946   Line Status Infusing 12/05/22 0946   Line Care Connections checked and tightened 12/05/22 0946   Phlebitis Assessment No symptoms 12/05/22 0946   Infiltration Assessment 0 12/05/22 0946   Alcohol Cap Used No 12/05/22 0946   Dressing Status Clean, dry & intact 12/05/22 0946   Dressing Type Transparent 12/05/22 0946        Opportunity for questions and clarification was provided. Patient transported with:   O2 @ 3 liters    VTE prophylaxis orders have been written for Leah Rangel. Patient and family given floor number and nurses name. Family updated re: pt status after security code verified.

## 2022-12-05 NOTE — ANESTHESIA POSTPROCEDURE EVALUATION
Department of Anesthesiology  Postprocedure Note    Patient: Clementina Ledbetter  MRN: [id number]  YOB: 1926  Date of evaluation: 12/5/2022      Procedure Summary     Date: 12/05/22 Room / Location: [facility] MAIN OR  / [facility] MAIN OR    Anesthesia Start: 0727 Anesthesia Stop: 3312    Procedure: OPEN REDUCTION INTERNAL FIXATION RIGHT HIP FRACTURE WITH TROCHANTERIC FIXATION  NAIL (Right: Hip) Diagnosis:       Closed fracture of right hip requiring operative repair with routine healing, subsequent encounter      (Closed fracture of right hip requiring operative repair with routine healing, subsequent encounter [S72.001D])    Providers: Oscar Jeffrey MD Responsible Provider: Heladio Manrique MD    Anesthesia Type: general ASA Status: 4 - Emergent          Anesthesia Type: No value filed.     Ramon Phase I: Raomn Score: 9    Ramon Phase II:        Anesthesia Post Evaluation    Patient location during evaluation: PACU  Patient participation: complete - patient participated  Level of consciousness: awake  Airway patency: patent  Nausea & Vomiting: no nausea  Complications: no  Cardiovascular status: blood pressure returned to baseline  Respiratory status: acceptable  Hydration status: euvolemic  Multimodal analgesia pain management approach

## 2022-12-05 NOTE — PROGRESS NOTES
PT/OT       Pt. Presents to [facility] with R IT hip fx. No beds DTN. Pt. Is on bed rest and will go DTN for surgical fixation on Mon., 12/5. Will await post op orders before we initiate therapy melissa.     Ross Moser [place]

## 2022-12-05 NOTE — PROGRESS NOTES
Pt family does not want pt to receive oxycodone for pain relief and requests this medication to be added to pt allergies.  This nurse added oxycodone to pt allergy list and notified MD.

## 2022-12-05 NOTE — WOUND CARE
Bilateral lower legs with scars and skin changes consistent with chronic venous stasis ulcers, with flaky skin and fungal towers. Patient is not agreeable to aggressive cleaning, the scales on the lower legs are consistent with months of dried exudate. Majority cleansed with hibiclens and xeroform/ lotion applied to scales, may have fungal compontent as well, Will use xeroform aquaphor , abd's kerlex  and ace daily. Will monitor.

## 2022-12-06 LAB
HCT VFR BLD AUTO: 24.2 % (ref 35.8–46.3)
HGB BLD-MCNC: 7.9 G/DL (ref 11.7–15.4)
MM INDURATION, POC: 0 MM (ref 0–5)
PPD, POC: NEGATIVE

## 2022-12-06 PROCEDURE — 97530 THERAPEUTIC ACTIVITIES: CPT

## 2022-12-06 PROCEDURE — 85014 HEMATOCRIT: CPT

## 2022-12-06 PROCEDURE — 6370000000 HC RX 637 (ALT 250 FOR IP): Performed by: ORTHOPAEDIC SURGERY

## 2022-12-06 PROCEDURE — 6370000000 HC RX 637 (ALT 250 FOR IP): Performed by: FAMILY MEDICINE

## 2022-12-06 PROCEDURE — 2580000003 HC RX 258: Performed by: ORTHOPAEDIC SURGERY

## 2022-12-06 PROCEDURE — 97535 SELF CARE MNGMENT TRAINING: CPT

## 2022-12-06 PROCEDURE — 1100000000 HC RM PRIVATE

## 2022-12-06 PROCEDURE — 36415 COLL VENOUS BLD VENIPUNCTURE: CPT

## 2022-12-06 PROCEDURE — 97162 PT EVAL MOD COMPLEX 30 MIN: CPT

## 2022-12-06 PROCEDURE — 2580000003 HC RX 258: Performed by: FAMILY MEDICINE

## 2022-12-06 PROCEDURE — 97166 OT EVAL MOD COMPLEX 45 MIN: CPT

## 2022-12-06 RX ORDER — ACETAMINOPHEN 325 MG/1
650 TABLET ORAL EVERY 6 HOURS PRN
Status: DISCONTINUED | OUTPATIENT
Start: 2022-12-06 | End: 2022-12-07

## 2022-12-06 RX ORDER — TEMAZEPAM 15 MG/1
15 CAPSULE ORAL NIGHTLY
Status: DISCONTINUED | OUTPATIENT
Start: 2022-12-06 | End: 2022-12-08 | Stop reason: HOSPADM

## 2022-12-06 RX ORDER — ACETAMINOPHEN 650 MG/1
650 SUPPOSITORY RECTAL EVERY 6 HOURS PRN
Status: DISCONTINUED | OUTPATIENT
Start: 2022-12-06 | End: 2022-12-07

## 2022-12-06 RX ORDER — SODIUM CHLORIDE 0.9 % (FLUSH) 0.9 %
5-40 SYRINGE (ML) INJECTION EVERY 12 HOURS SCHEDULED
Status: DISCONTINUED | OUTPATIENT
Start: 2022-12-06 | End: 2022-12-08 | Stop reason: HOSPADM

## 2022-12-06 RX ORDER — SODIUM CHLORIDE 0.9 % (FLUSH) 0.9 %
5-40 SYRINGE (ML) INJECTION PRN
Status: DISCONTINUED | OUTPATIENT
Start: 2022-12-06 | End: 2022-12-08 | Stop reason: HOSPADM

## 2022-12-06 RX ORDER — SODIUM CHLORIDE 9 MG/ML
INJECTION, SOLUTION INTRAVENOUS PRN
Status: DISCONTINUED | OUTPATIENT
Start: 2022-12-06 | End: 2022-12-08 | Stop reason: HOSPADM

## 2022-12-06 RX ADMIN — SODIUM CHLORIDE, PRESERVATIVE FREE 10 ML: 5 INJECTION INTRAVENOUS at 20:59

## 2022-12-06 RX ADMIN — TEMAZEPAM 15 MG: 15 CAPSULE ORAL at 20:40

## 2022-12-06 RX ADMIN — ASPIRIN 81 MG: 81 TABLET ORAL at 08:52

## 2022-12-06 RX ADMIN — ACETAMINOPHEN 650 MG: 325 TABLET ORAL at 20:35

## 2022-12-06 RX ADMIN — BISACODYL 5 MG: 5 TABLET, COATED ORAL at 08:52

## 2022-12-06 RX ADMIN — Medication: at 08:52

## 2022-12-06 RX ADMIN — LEVOTHYROXINE SODIUM 88 MCG: 0.09 TABLET ORAL at 05:27

## 2022-12-06 RX ADMIN — ASPIRIN 81 MG: 81 TABLET ORAL at 20:35

## 2022-12-06 RX ADMIN — ACETAMINOPHEN 650 MG: 325 TABLET ORAL at 14:23

## 2022-12-06 RX ADMIN — HYDROCHLOROTHIAZIDE 25 MG: 25 TABLET ORAL at 08:52

## 2022-12-06 RX ADMIN — SODIUM CHLORIDE, PRESERVATIVE FREE 10 ML: 5 INJECTION INTRAVENOUS at 09:00

## 2022-12-06 RX ADMIN — ACETAMINOPHEN 650 MG: 325 TABLET ORAL at 05:54

## 2022-12-06 RX ADMIN — POLYETHYLENE GLYCOL 3350 17 G: 17 POWDER, FOR SOLUTION ORAL at 20:35

## 2022-12-06 RX ADMIN — METOPROLOL TARTRATE 50 MG: 50 TABLET, FILM COATED ORAL at 08:52

## 2022-12-06 RX ADMIN — METOPROLOL TARTRATE 50 MG: 50 TABLET, FILM COATED ORAL at 20:35

## 2022-12-06 ASSESSMENT — PAIN DESCRIPTION - ONSET
ONSET: GRADUAL
ONSET: GRADUAL

## 2022-12-06 ASSESSMENT — PAIN DESCRIPTION - LOCATION
LOCATION: HIP

## 2022-12-06 ASSESSMENT — PAIN DESCRIPTION - ORIENTATION
ORIENTATION: RIGHT

## 2022-12-06 ASSESSMENT — PAIN DESCRIPTION - FREQUENCY
FREQUENCY: INTERMITTENT
FREQUENCY: INTERMITTENT

## 2022-12-06 ASSESSMENT — PAIN SCALES - GENERAL
PAINLEVEL_OUTOF10: 0
PAINLEVEL_OUTOF10: 5
PAINLEVEL_OUTOF10: 0
PAINLEVEL_OUTOF10: 3
PAINLEVEL_OUTOF10: 0
PAINLEVEL_OUTOF10: 3

## 2022-12-06 ASSESSMENT — PAIN DESCRIPTION - DESCRIPTORS
DESCRIPTORS: ACHING

## 2022-12-06 ASSESSMENT — PAIN - FUNCTIONAL ASSESSMENT
PAIN_FUNCTIONAL_ASSESSMENT: ACTIVITIES ARE NOT PREVENTED
PAIN_FUNCTIONAL_ASSESSMENT: PREVENTS OR INTERFERES SOME ACTIVE ACTIVITIES AND ADLS
PAIN_FUNCTIONAL_ASSESSMENT: ACTIVITIES ARE NOT PREVENTED

## 2022-12-06 ASSESSMENT — PAIN DESCRIPTION - PAIN TYPE
TYPE: SURGICAL PAIN
TYPE: SURGICAL PAIN

## 2022-12-06 NOTE — CONSULTS
Hospitalist Consult         NAME:            Kendall Bynum    Age:                80 y.o.    :               3/11/1926    MRN:              [id number]    PCP: Elsa oPlk MD    Consulting MD:    Treatment Team: Attending Provider: Chauncey Rushing DO; Surgeon: Arcelia Glynn MD; : Yana Holder, RN; Charge Nurse: Ella Al, RN; Occupational Therapist: Read Hives, OT         No chief complaint on file. HPI: Patient is a transfer from [place] on 22 for right hip ORIF on . Dr. Padmini Hines was named attending when transferred to [facility]. We are asked to assume care as primary     per HP from [facility] y.o. female with medical history of chronic CHF (EF 62% from 2015), PAF/SSS with pacemaker, pulmonary hypertension who presented from [address] [place] [address] with right hip pain. Patient was alert and oriented x3 admission and stated that night of 12/3, she walked to the kitchen, opened door of refrigerator to get water but door was not opening or closing right. Next thing she knew, she fell to the floor. She denies ever having LOC or hit her head. She was awake and alert. She denies having dizziness, lightheadedness, SOB, chest pain prior to fall. Denies any shaking episodes. She use an alert button to notify staff. Staff at facility discussed event with patient's son, due to her history of frequent falls without any sustained injury, she was brought back to bed and observed. However, she woke up at 430 and staff noticed she started having episode of N/V with lethargy, therefore EMS was called. Of note, patient was under hospice care for her significant cardiac history along with chronic wounds. In ED, Hgb 9.0, CR 1.02. UA unremarkable. XR hip shows acute, comminuted, displaced right hip intertrochanteric fracture CT head shows no acute findings. CXR shows no acute findings. Patient was given morphine, Zofran in ED.  ED physician was informed by house supervisor that patient will need to be admitted here at [place] due to lack of bed availability at [facility] facility. Plan will be to transfer the patient directly to the OR in a.m. 12/5 to repair for hip fracture per orthopedics plan. \"    Today, having some right arm pain but able to move well. [facility]. No chest pain or SOB. Chronic wounds to legs. Past Medical History:   Diagnosis Date    Anxiety 5/11/2016    Arthritis     Bradycardia     pacemaker placed 1/2017    CHF (congestive heart failure) ([facility] [address]     Colles' fracture 03/2017    HTN (hypertension)     Hypothyroidism     Pacemaker 01/19/2017    Biotronik    Pulmonary hypertension ([facility] [address] 05/11/2016            Past Surgical History:   Procedure Laterality Date    APPENDECTOMY      CARPAL TUNNEL RELEASE Left     HIP SURGERY Right 12/5/2022    OPEN REDUCTION INTERNAL FIXATION RIGHT HIP FRACTURE WITH TROCHANTERIC FIXATION  NAIL performed by Whitney William MD at [facility] MAIN OR    HX OPEN REDUCTION INTERNAL FIXATION Left 03/2017    wrist/Colles fx    OTHER SURGICAL HISTORY      ear surg on R ear    PACEMAKER PLACEMENT  01/2017    GEORGES AND BSO (CERVIX REMOVED)      TONSILLECTOMY AND ADENOIDECTOMY      TOTAL KNEE ARTHROPLASTY Left             Family History   Problem Relation Age of Onset    Cancer Brother         brain tumor    No Known Problems Mother     No Known Problems Father     Cancer Sister         breast CA            Social History     Social History Narrative    Not on file            Social History     Tobacco Use    Smoking status: Never    Smokeless tobacco: Never   Substance Use Topics    Alcohol use: No            Social History     Substance and Sexual Activity   Drug Use No                 Allergies   Allergen Reactions    Meperidine Nausea And Vomiting    Oxycodone Itching and Nausea And Vomiting     Pt family does not want this as an option for pain management.      Sulfa Antibiotics Rash    Propoxyphene Other (See Comments)     Unknown reaction    Amlodipine Rash    Codeine Nausea And Vomiting    Penicillins Rash            Prior to Admission medications    Medication Sig Start Date End Date Taking? Authorizing Provider   hydroCHLOROthiazide (HYDRODIURIL) 25 MG tablet  11/29/22   Historical Provider, MD   acetaminophen (TYLENOL) 325 MG tablet  11/29/22   Historical Provider, MD   ondansetron (ZOFRAN) 4 MG tablet  11/28/22   Historical Provider, MD   traMADol (ULTRAM) 50 MG tablet  11/27/22   Historical Provider, MD   ELIQUIS 2.5 MG TABS tablet TAKE 1 TABLET BY MOUTH TWO TIMES A DAY. Patient not taking: No sig reported 11/28/22   Fátima Dove MD   ASPIRIN LOW DOSE 81 MG EC tablet  10/18/22   Historical Provider, MD   potassium chloride (KLOR-CON M) 10 MEQ extended release tablet  10/18/22   Historical Provider, MD   senna (SENOKOT) 8.6 MG TABS tablet  10/7/22   Historical Provider, MD   torsemide (DEMADEX) 20 MG tablet  10/25/22   Historical Provider, MD   triamcinolone (KENALOG) 0.025 % cream  10/20/22   Historical Provider, MD   metoprolol tartrate (LOPRESSOR) 50 MG tablet TAKE 1 TABLET BY MOUTH TWO TIMES A DAY. 8/19/22   Fátima Dove MD   levothyroxine (SYNTHROID) 88 MCG tablet TAKE 1 TABLET BY MOUTH ONCE DAILY 9/16/19   Ar Automatic Reconciliation   temazepam (RESTORIL) 15 MG capsule 1 po qhs 1/11/19   Ar Automatic Reconciliation                      Review of Systems         Constitutional: NAD    Eyes: PERRLA    Ears, nose, mouth, throat, and face: normocephalic, no thrush, lesions or exudate. Moist mucous membranes    Respiratory: no SOB, SHANNON or hemoptysis. No chronic cough    Cardiovascular: no CP, palpitations or SOB    Gastrointestinal: no abdominal pain, nausea, vomiting or diarrhea. No constipation.  No hematemesis, hematochezia or BRBPR    Genitourinary:no urgency, frequency or dysuria, no nocturia    Integument/breast: lesions to legs that are chronic    Hematologic/lymphatic: negative for known bleeding disorders    Musculoskeletal:Right arm pain     Neurological: negative for dizziness lightheadedness, syncopal or presyncopal events, no seizure or h/o CVA    Behavioral/Psych: no depression, anxiety or suicidal ideation    Endocrine: negative for polydipsea, polyuria or intolerance to heat or cold    Allergic/Immunologic: negative for allergic responses              Objective:         Patient Vitals for the past 24 hrs:   Temp Pulse Resp BP SpO2   12/06/22 1520 97.4 °F (36.3 °C) 76 18 (!) 119/57 98 %   12/06/22 1041 97.7 °F (36.5 °C) 80 18 (!) 116/51 99 %   12/06/22 0722 98.1 °F (36.7 °C) 84 18 117/63 96 %   12/06/22 0400 97.7 °F (36.5 °C) 84 18 117/70 95 %   12/06/22 0009 -- -- -- (!) 112/55 --   12/05/22 2339 97.5 °F (36.4 °C) 84 18 (!) 96/45 97 %   12/05/22 1946 98.1 °F (36.7 °C) 85 18 (!) 114/57 93 %            12/06 0701 - 12/06 1900  In: 60 [P.O.:60]  Out: -     12/04 1901 - 12/06 0700  In: 1010 [P.O.:60; I.V.:700]  Out: 1850 [Urine:1775]         Data Review:   Recent Results (from the past 24 hour(s))   Hemoglobin and Hematocrit    Collection Time: 12/06/22  5:02 AM   Result Value Ref Range    Hemoglobin 7.9 (L) 11.7 - 15.4 g/dL    Hematocrit 24.2 (L) 35.8 - 46.3 %   PLEASE READ & DOCUMENT PPD TEST IN 48 HRS    Collection Time: 12/06/22  5:09 PM   Result Value Ref Range    PPD, (POC) Negative Negative    mm Induration 0 0 - 5 mm            Physical Exam:         General:    Alert, cooperative, no distress, very pleasant, frail appearing. Flushed face    Eyes:    Conjunctivae/corneas clear. Ears:    Normal     Nose:    Nares normal.     Mouth/Throat:    No obvious deformity     Neck:    no JVD. Back:     Deferred but thoracic kyphosis noted    Lungs:     Clear to auscultation bilaterally. Heart:    Faint blowing systolic murmur best heard at right 2nd intercostal space, irregularly irregular     Abdomen:     Soft, non-tender.  Bowel sounds normal.     Extremities:    Small amount of old blood noted to right lateral hip bandage. Legs with wrappings. Good sensation to right leg. Right UE with good ROM, intact humeral and radial pulse. No obvious ecchymosis to right UE    Skin:    Multiple scabs    Neurologic:    CNII-XII intact. Limited ROM in bed. Assessment and Plan         Principal Problem:    Closed right hip fracture, initial encounter [facility])  Active Problems:    Anxiety    Hypothyroidism    Palpitations    HTN (hypertension)    CHF (congestive heart failure) ([facility])    Pacemaker    Pulmonary hypertension ([facility] [address]  Resolved Problems:    * No resolved hospital problems. *    R hip fracture  Frequent falls  S/p ORIF on 12/5. Will need rehab. Chronic CHF  Persistent A. Fib /SSS  Pacemaker in place  Pulmonary hypertension  HTN  Pt of [facility] cardiology Dr. Favian Martinez. No longer takes Eliquis at home, only ASA. No longer takes Demadex at home, only HCTZ. Patient previously on hospice for severe CHF prior to admission.   Last TTE in epic was in 7/7/2015 with EF 62%  Continue home ASA  Continue home HCTZ, metoprolol     Normocytic anemia  Hg improving      Insomnia  Continue home Restoril     Hypothyroidism  Continue home Synthroid     Chronic bilateral lower extremity wounds  Consult wound team     DVT prophylaxis - ASA BID    Signed By:    Keesha Alejo DO    December 6, 2022

## 2022-12-06 NOTE — PROGRESS NOTES
ACUTE PHYSICAL THERAPY GOALS:   (Developed with and agreed upon by patient and/or caregiver.)     (1.) Mary Looney  will move from supine to sit and sit to supine  with MINIMAL ASSIST within 7 treatment day(s). (2.) Mary Looney will transfer from bed to chair and chair to bed with MINIMAL ASSIST using the least restrictive device within 7 treatment day(s). (3.) Mary Looney will ambulate with MINIMAL ASSIST for 30 feet with the least restrictive device within 7 treatment day(s). (4.) Mary Looney will perform standing static and dynamic balance activities x 10 minutes with MINIMAL ASSIST to improve safety within 7 treatment day(s). (5.) Mary Looney will perform bilateral lower extremity exercises x 20 min for HEP with INDEPENDENCE to improve strength, endurance, and functional mobility within 7 treatment day(s). PHYSICAL THERAPY: Daily Note PM   (Link to Caseload Tracking: PT Visit Days : 1  Time In/Out PT Charge Capture  Rehab Caseload Tracker  Orders    Mary Looney is a 80 y.o. female   PRIMARY DIAGNOSIS: <principal problem not specified>  Hip Fracture  Procedure(s) (LRB):  OPEN REDUCTION INTERNAL FIXATION RIGHT HIP FRACTURE WITH TROCHANTERIC FIXATION  NAIL (Right)  1 Day Post-Op  Inpatient: Payor: MEDICARE / Plan: MEDICARE PART A AND B / Product Type: *No Product type* /     ASSESSMENT:     REHAB RECOMMENDATIONS:   Recommendation to date pending progress:  Setting:  Short-term Rehab    Equipment:    To Be Determined     ASSESSMENT:  Ms. Tesha Patterson was received sitting in chair, agreeable to therapy. She was able to perform a stand pivot transfer back to bed with RW and maxA of 2. Pt continues to demonstrate poor posture and increased buckling of R LE but improvements noted with manual cueing for weight shifts, maneuvering RW. Good progress, will continue to follow.       SUBJECTIVE:   Ms. Tesha Patterson states, \"you all did great\"     Social/Functional Lives With: Alone  Type of Home: Assisted living  Home Equipment: Gladis Reyes, [address] [facility] Help From: Other (comment), Family  ADL Assistance: Needs assistance  Bath:  (needs assist with sponge bathing due to [employer] wounds\)  Homemaking Assistance: Independent (basic cooking & housekeeping)  Ambulation Assistance: Needs assistance (cane or rollator)  OBJECTIVE:     PAIN: VITALS / O2: [place] / Josefine New / DRAINS:   Pre Treatment:   Pain Assessment: None - Denies Pain      Post Treatment: 0 Vitals        Oxygen    None    RESTRICTIONS/PRECAUTIONS:  Restrictions/Precautions  Restrictions/Precautions: Fall Risk  Restrictions/Precautions: Fall Risk     MOBILITY: I Mod I S SBA CGA Min Mod Max Total  NT x2 Comments:   Bed Mobility    Rolling [] [] [] [] [] [] [] [x] [] [] []    Supine to Sit [] [] [] [] [] [] [] [] [] [x] []    Scooting [] [] [] [] [] [x] [] [] [] [] [x]    Sit to Supine [] [] [] [] [] [] [] [x] [] [] []    Transfers    Sit to Stand [] [] [] [] [] [] [] [x] [] [] [x]    Bed to Chair [] [] [] [] [] [] [] [x] [] [] [x]    Stand to Sit [] [] [] [] [] [] [] [x] [] [] [x]     [] [] [] [] [] [] [] [] [] [] []    I=Independent, Mod I=Modified Independent, S=Supervision, SBA=Standby Assistance, CGA=Contact Guard Assistance,   Min=Minimal Assistance, Mod=Moderate Assistance, Max=Maximal Assistance, Total=Total Assistance, NT=Not Tested    BALANCE: Good Fair+ Fair Fair- Poor NT Comments   Sitting Static [x] [] [] [] [] []    Sitting Dynamic [] [x] [] [] [] []              Standing Static [] [] [] [] [x] []    Standing Dynamic [] [] [] [] [x] []      GAIT: I Mod I S SBA CGA Min Mod Max Total  NT x2 Comments:   Level of Assistance [] [] [] [] [] [] [] [x] [] [] [x]    Distance Pivot steps to bed    DME Gait Belt and Rolling Walker    Gait Quality Antalgic, Decreased adin , Decreased step clearance, Decreased step length, Decreased stance, Trunk flexion, and R knee buckling    Weightbearing Status      Stairs      I=Independent, Mod I=Modified Independent, S=Supervision, SBA=Standby Assistance, CGA=Contact Guard Assistance,   Min=Minimal Assistance, Mod=Moderate Assistance, Max=Maximal Assistance, Total=Total Assistance, NT=Not Tested    PLAN:   FREQUENCY AND DURATION: BID for duration of hospital stay or until stated goals are met, whichever comes first.    TREATMENT:   TREATMENT:   Therapeutic Activity (13 Minutes): Therapeutic activity included Rolling, Sit to Supine, Scooting, Lateral Scooting, Transfer Training, Ambulation on level ground, Sitting balance , and Standing balance to improve functional Activity tolerance, Balance, Mobility, and Strength. TREATMENT GRID:  N/A    AFTER TREATMENT PRECAUTIONS: Alarm Activated, Bed, Bed/Chair Locked, Call light within reach, Needs within reach, and RN notified    INTERDISCIPLINARY COLLABORATION:  RN/ PCT and Rehab Attendant    EDUCATION: Education Given To: Patient  Education Provided: Role of Therapy;Plan of Care; Fall Prevention Strategies; Equipment;Transfer Training;Precautions  Education Method: Verbal  Barriers to Learning: None  Education Outcome: Verbalized understanding    TIME IN/OUT:  Time In: 1303  Time Out: [address]  Minutes: [address] SHAYLA, PT

## 2022-12-06 NOTE — PROGRESS NOTES
ACUTE PHYSICAL THERAPY GOALS:   (Developed with and agreed upon by patient and/or caregiver.)    (1.) Fidelina Perez  will move from supine to sit and sit to supine  with MINIMAL ASSIST within 7 treatment day(s). (2.) Fidelina Perez will transfer from bed to chair and chair to bed with MINIMAL ASSIST using the least restrictive device within 7 treatment day(s). (3.) Fidelina Perez will ambulate with MINIMAL ASSIST for 30 feet with the least restrictive device within 7 treatment day(s). (4.) Fidelina Perez will perform standing static and dynamic balance activities x 10 minutes with MINIMAL ASSIST to improve safety within 7 treatment day(s). (5.) Fidelina Perez will perform bilateral lower extremity exercises x 20 min for HEP with INDEPENDENCE to improve strength, endurance, and functional mobility within 7 treatment day(s). PHYSICAL THERAPY Initial Assessment, Daily Note, and AM  (Link to Caseload Tracking: PT Visit Days : 1  Acknowledge Orders  Time In/Out  PT Charge Capture  Rehab Caseload Tracker    R LE WBAT    Fidelina Perez is a 80 y.o. female   PRIMARY DIAGNOSIS: <principal problem not specified>  Hip Fracture  Procedure(s) (LRB):  OPEN REDUCTION INTERNAL FIXATION RIGHT HIP FRACTURE WITH TROCHANTERIC FIXATION  NAIL (Right)  1 Day Post-Op  Reason for Referral: Pain in Right Hip (M25.551)  Difficulty in walking, Not elsewhere classified (R26.2)  History of falling (Z91.81)  Inpatient: Payor: MEDICARE / Plan: MEDICARE PART A AND B / Product Type: *No Product type* /     ASSESSMENT:     REHAB RECOMMENDATIONS:   Recommendation to date pending progress:  Setting:  Short-term Rehab    Equipment:    To Be Determined     ASSESSMENT:  Ms. Immanuel Mathur  is a 80year old F who presents s/p above procedure POD 1 after a fall at her [facility]. Pt presents alert and oriented x 4. She reports she was living in independent living  but recently moved to assisted living due to LE wounds.  She ambulates with a rollator or cane. This date pt performs mobility including bed mobility with maxA of 2 and extended time. She was able to perform stand pivot transfers to chair and BSC with RW and maxA of 2. Pt with increased trunk flexion, posterior lean- frequent cueing needed for upright posture and task sequencing. She did well advancing R LE but had increased difficulty koby shifting onto R LE to advance L LE. Pt presents as functioning below her baseline, with deficits in mobility including transfers, gait, balance, and activity tolerance. Pt will benefit from skilled therapy services to address stated deficits to promote return to highest level of function, independence, and safety. Will continue to follow.      [address] [facility] [address] [id number] AM-PAC 6 Clicks Basic Mobility Inpatient Short Form  AM-PAC Mobility Inpatient   How much difficulty turning over in bed?: A Lot  How much difficulty sitting down on / standing up from a chair with arms?: A Lot  How much difficulty moving from lying on back to sitting on side of bed?: A Lot  How much help from another person moving to and from a bed to a chair?: A Lot  How much help from another person needed to walk in hospital room?: Total  How much help from another person for climbing 3-5 steps with a railing?: Total  AM-PAC Inpatient Mobility Raw Score : 10  AM-PAC Inpatient T-Scale Score : 32.29  Mobility Inpatient CMS 0-100% Score: 76.75  Mobility Inpatient CMS G-Code Modifier : CL    SUBJECTIVE:   Ms. Humza Del Rio states, \"I opened the refrigerator and then I don't remember what happened\"     Social/Functional Lives With: Alone  Type of Home: Assisted living  Home Equipment: Bobby Villareal, [address] [facility] Help From: Other (comment), Family  ADL Assistance: Needs assistance  Bath:  (needs assist with sponge bathing due to [employer] wounds\)  Homemaking Assistance: Independent (basic cooking & housekeeping)  Ambulation Assistance: Needs assistance (cane or rollator)    OBJECTIVE:     PAIN: Rhunette Began / O2: Dorrine  / [place] / DRAINS:   Pre Treatment:   Pain Assessment: None - Denies Pain      Post Treatment: 0 Vitals        Oxygen      None    RESTRICTIONS/PRECAUTIONS:  Restrictions/Precautions: Fall Risk  Right Lower Extremity Weight Bearing: Weight Bearing As Tolerated              GROSS EVALUATION: B LE Intact Impaired (Comments):   AROM []  R LE postop limitations   PROM []    Strength []  R LE postop limitations   Balance [] Posture: Fair  Sitting - Static: Good  Sitting - Dynamic: Fair  Standing - Static: Poor  Standing - Dynamic: Poor   Posture [] Forward Head  Rounded Shoulders  Trunk Flexion   Sensation []     Coordination []      Tone []     Edema []    Activity Tolerance [] Patient limited by fatigue    []      COGNITION/  PERCEPTION: Intact Impaired (Comments):   Orientation [x]     Vision []     Hearing []  [place]   Cognition  [x]       MOBILITY: I Mod I S SBA CGA Min Mod Max Total  NT x2 Comments:   Bed Mobility    Rolling [] [] [] [] [] [] [] [x] [] [] [x]    Supine to Sit [] [] [] [] [] [] [] [x] [] [] [x]    Scooting [] [] [] [] [] [] [] [x] [] [] [x]    Sit to Supine [] [] [] [] [] [] [] [x] [] [] [x]    Transfers    Sit to Stand [] [] [] [] [] [] [] [x] [] [] [x]    Bed to Chair [] [] [] [] [] [] [] [x] [] [] [x]    Stand to Sit [] [] [] [] [] [] [] [x] [] [] [x]     [] [] [] [] [] [] [] [] [] [] []    I=Independent, Mod I=Modified Independent, S=Supervision, SBA=Standby Assistance, CGA=Contact Guard Assistance,   Min=Minimal Assistance, Mod=Moderate Assistance, Max=Maximal Assistance, Total=Total Assistance, NT=Not Tested    GAIT: I Mod I S SBA CGA Min Mod Max Total  NT x2 Comments:   Level of Assistance [] [] [] [] [] [] [] [x] [] [] [x]    Distance 3 feet    DME Gait Belt and Rolling Walker    Gait Quality Antalgic, Decreased adin , Decreased step clearance, Decreased step length, Decreased stance, Trunk flexion, and R LE buckling    Weightbearing Status Restrictions/Precautions  Restrictions/Precautions: Fall Risk    Stairs      I=Independent, Mod I=Modified Independent, S=Supervision, SBA=Standby Assistance, CGA=Contact Guard Assistance,   Min=Minimal Assistance, Mod=Moderate Assistance, Max=Maximal Assistance, Total=Total Assistance, NT=Not Tested    PLAN:   FREQUENCY AND DURATION: BID for duration of hospital stay or until stated goals are met, whichever comes first.    THERAPY PROGNOSIS: Good    PROBLEM LIST:   (Skilled intervention is medically necessary to address:)  Decreased ADL/Functional Activities  Decreased Activity Tolerance  Decreased AROM/PROM  Decreased Balance  Decreased Gait Ability  Decreased Strength  Decreased Transfer Abilities  Increased Pain INTERVENTIONS PLANNED:   (Benefits and precautions of physical therapy have been discussed with the patient.)  Self Care Training  Therapeutic Activity  Therapeutic Exercise/HEP  Neuromuscular Re-education  Gait Training  Education       TREATMENT:   EVALUATION: MODERATE COMPLEXITY: (Untimed Charge)    TREATMENT:   Co-Treatment PT/OT necessary due to patient's decreased overall endurance/tolerance levels, as well as need for high level skilled assistance to complete functional transfers/mobility and functional tasks  Therapeutic Activity (29 Minutes): Therapeutic activity included Rolling, Supine to Sit, Scooting, Transfer Training, Ambulation on level ground, Sitting balance , and Standing balance to improve functional Activity tolerance, Balance, Mobility, and Strength. TREATMENT GRID:  N/A    AFTER TREATMENT PRECAUTIONS: Alarm Activated, Bed/Chair Locked, Call light within reach, Needs within reach, and RN notified    INTERDISCIPLINARY COLLABORATION:  RN/ PCT and OT/ QUIROZ    EDUCATION: Education Given To: Patient  Education Provided: Role of Therapy;Plan of Care; Fall Prevention Strategies; Equipment;Transfer Training;Precautions  Education Method: Verbal  Barriers to Learning: None  Education Outcome: Verbalized understanding    TIME IN/OUT:  Time In: 0904  Time Out: 0941  Minutes: Sebastian [address]efer 39. SHAYLA, PT

## 2022-12-06 NOTE — ACP (ADVANCE CARE PLANNING)
[facility]  [facility] of better care     Advance Care Planning   Admit Date:  2022 10:00 PM   Name:  Luisa Nicole   Age:  80 y.o. Sex:  female  :  3/11/1926   MRN:  [id number]   Room:  [address] is able to make her own decisions:   Yes    Other people present:   Daughter in law    Patient / surrogate decision-maker directed code status:  DNR    Patient or surrogate consented to discussion of the current conditions, workup, management plans, prognosis, and the risk for further deterioration. Time spent: 17 minutes in direct discussion.       Signed:  Vania Jacobson DO

## 2022-12-06 NOTE — PROGRESS NOTES
ACUTE OCCUPATIONAL THERAPY GOALS:   (Developed with and agreed upon by patient and/or caregiver.)  1. Patient will complete self feeding and grooming with setup. 2. Patient will complete functional transfers with moderate assistance while maintaining WBAT  status in RLE and with adaptive equipment as needed. 3. Patient will complete lower body bathing and dressing with moderate assistance and adaptive equipment as needed. 4. Patient will complete toileting and toilet transfer with moderate assistance. 5. Patient will tolerate 30 minutes of OT treatment with as needed rest breaks to increase activity tolerance for ADLs. 6. Patient will participate in at least 20 minutes of BUE therapeutic exercises to strengthen BUE for functional transfers. Timeframe: 7 visits       OCCUPATIONAL THERAPY Initial Assessment and Daily Note       OT Visit Days: 1  Acknowledge Orders  Time  OT Charge Capture  Rehab Caseload Tracker      Danielle Roth is a 80 y.o. female   PRIMARY DIAGNOSIS: <principal problem not specified>  Hip Fracture  Procedure(s) (LRB):  OPEN REDUCTION INTERNAL FIXATION RIGHT HIP FRACTURE WITH TROCHANTERIC FIXATION  NAIL (Right)  1 Day Post-Op  Reason for Referral: Pain in Right Hip (M25.551)  History of falling (Z91.81)  Inpatient: Payor: MEDICARE / Plan: MEDICARE PART A AND B / Product Type: *No Product type* /     ASSESSMENT:     REHAB RECOMMENDATIONS:   Recommendation to date pending progress:  Setting:  Short-term Rehab    Equipment:    To Be Determined     ASSESSMENT:  Ms. Marichuy Iraheta is a pleasant 79 y/o F admitted after fall with R hip pain, now POD#1 from above procedure and WBAT RLE. Relevant PMH of CHF and chronic LE wounds. Recently moved from independent living to [facility] where she gets assist with bathing due to LE wounds. Otherwise fairly independent and ambulatory with rollator or cane. Today reports no pain, just soreness.  Mobile with maximal assist x2 and maximal verbal/ visual/ tactile cueing for upright posture & technique. Participated in LB ADLs with max to total assist. She is functioning below baseline but motivated to participate in therapy & regain independence. Will follow. [address] [facility] [address] [id number] AM-PAC 6 Clicks Daily Activity Inpatient Short Form:    AM-PAC Daily Activity Inpatient   How much help for putting on and taking off regular lower body clothing?: A Lot  How much help for Bathing?: A Lot  How much help for Toileting?: A Lot  How much help for putting on and taking off regular upper body clothing?: A Little  How much help for taking care of personal grooming?: A Little  How much help for eating meals?: A Little  AM-[facility] Inpatient Daily Activity Raw Score: 15  AM-PAC Inpatient ADL T-Scale Score : 34.69  ADL Inpatient CMS 0-100% Score: 56.46  ADL Inpatient CMS G-Code Modifier : CK           SUBJECTIVE:     Ms. Rizwan Perez states, \"I can't believe I've lived this long. \"     Social/Functional Lives With: Alone  Type of Home: Assisted living  Home Equipment: [address] [facility] [facility] Help From: Other (comment), Family  ADL Assistance: Needs assistance  Bath:  (needs assist with sponge bathing due to [employer] wounds\)  Homemaking Assistance: Independent (basic cooking & housekeeping)  Ambulation Assistance: Needs assistance (cane or rollator)    OBJECTIVE:     Ashli Angulo / Nga Givens / AIRWAY: IV    RESTRICTIONS/PRECAUTIONS:  Restrictions/Precautions: Weight Bearing  Right Lower Extremity Weight Bearing: Weight Bearing As Tolerated    PAIN: VITALS / O2:   Pre Treatment:   Pain Assessment: 0-10  Pain Level: 0      Post Treatment: 0         Vitals          Oxygen            GROSS EVALUATION: INTACT IMPAIRED   (See Comments)   UE AROM [] []Gen decreased   UE PROM [] []NT   Strength []  Gen decreased      Posture / Balance [] Posture: Fair  Sitting - Static: Good  Sitting - Dynamic: Fair  Standing - Static: Poor  Standing - Dynamic: Poor   Sensation []  NT   Coordination []    NT   Tone []    NT Edema [] RLE post-op   Activity Tolerance [] Patient limited by fatigue     Hand Dominance R [] L []      COGNITION/  PERCEPTION: INTACT IMPAIRED   (See Comments)   Orientation [x]     Vision []  Glasses    Hearing []  [place]   Cognition  []  Some minor memory loss noted   Perception [x]       MOBILITY: I Mod I S SBA CGA Min Mod Max Total  NT x2 Comments:   Bed Mobility    Rolling [] [] [] [] [] [] [] [] [] [x] []    Supine to Sit [] [] [] [] [] [] [] [x] [] [] [x]    Scooting [] [] [] [] [] [] [x] [] [] [] [x]    Sit to Supine [] [] [] [] [] [] [] [] [] [x] []    Transfers    Sit to Stand [] [] [] [] [] [] [] [x] [] [] [x]    Bed to Chair [] [] [] [] [] [] [] [x] [] [] [x]    Stand to Sit [] [] [] [] [] [] [] [x] [] [] [x]    Tub/Shower [] [] [] [] [] [] [] [] [] [x] [x]     Toilet [] [] [] [] [] [] [] [x] [] [] [x]      [] [] [] [] [] [] [] [] [] [] []    I=Independent, Mod I=Modified Independent, S=Supervision/Setup, SBA=Standby Assistance, CGA=Contact Guard Assistance, Min=Minimal Assistance, Mod=Moderate Assistance, Max=Maximal Assistance, Total=Total Assistance, NT=Not Tested    ACTIVITIES OF DAILY LIVING: I Mod I S SBA CGA Min Mod Max Total NT Comments   BASIC ADLs:              Upper Body Bathing  [] [] [] [] [] [] [] [] [] []    Lower Body Bathing [] [] [] [] [] [] [] [] [] []    Toileting [] [] [] [] [] [] [] [x] [] []    Upper Body Dressing [] [] [] [] [] [] [] [] [] []    Lower Body Dressing [] [] [] [] [] [] [] [x] [] []    Feeding [] [] [] [] [] [] [] [] [] []    Grooming [] [] [x] [] [] [] [] [] [] []    Personal Device Care [] [] [] [] [] [] [] [] [] []    Functional Mobility [] [] [] [] [] [] [] [x] [] []    I=Independent, Mod I=Modified Independent, S=Supervision/Setup, SBA=Standby Assistance, CGA=Contact Guard Assistance, Min=Minimal Assistance, Mod=Moderate Assistance, Max=Maximal Assistance, Total=Total Assistance, NT=Not Tested    PLAN:   FREQUENCY/DURATION   OT Plan of Care: 4 times/week for duration of hospital stay or until stated goals are met, whichever comes first.    PROBLEM LIST:   (Skilled intervention is medically necessary to address:)  Decreased ADL/Functional Activities  Decreased Activity Tolerance  Decreased Balance  Decreased Gait Ability  Decreased Safety Awareness  Decreased Transfer Abilities  Increased Pain   INTERVENTIONS PLANNED:  (Benefits and precautions of occupational therapy have been discussed with the patient.)  Self Care Training  Therapeutic Activity  Therapeutic Exercise/HEP  Neuromuscular Re-education  Manual Therapy  Education         TREATMENT:     EVALUATION: MODERATE COMPLEXITY: (Untimed Charge)    TREATMENT:   Co-Treatment PT/OT necessary due to patient's decreased overall endurance/tolerance levels, as well as need for high level skilled assistance to complete functional transfers/mobility and functional tasks  Self Care (23 minutes): Patient participated in toileting, lower body dressing, and grooming ADLs in unsupported sitting with maximal verbal cueing to increase independence and increase safety awareness. Patient also participated in functional transfer training to increase independence, decrease assistance required, and increase safety awareness.      TREATMENT GRID:  N/A    AFTER TREATMENT PRECAUTIONS: Alarm Activated, Bed/Chair Locked, Call light within reach, Chair, Needs within reach, and RN notified    INTERDISCIPLINARY COLLABORATION:  RN/ PCT, PT/ PTA, and OT/ QUIROZ    EDUCATION:  Education Given To: Patient  Education Provided: Role of Therapy;Plan of Care  Education Outcome: Verbalized understanding    TOTAL TREATMENT DURATION AND TIME:  Time In: 0591  Time Out: [phone]  Minutes: [address] OT

## 2022-12-06 NOTE — PROGRESS NOTES
Orthopedic Joint Progress Note    2022  Admit Date: 2022  Admit Diagnosis: Hip Fracture    1 Day Post-Op    Subjective:     Stacey Langley sitting up eating lunch. Alert, oriented. Spoke about her surgery and postop expectations. Review of Systems: unchanged    Objective:     PT/OT: slow progress    PATIENT MOBILITY                           Vital Signs:    Blood pressure (!) 116/51, pulse 80, temperature 97.7 °F (36.5 °C), temperature source Oral, resp. rate 18, height 4' 11\" (1.499 m), weight 102 lb (46.3 kg), SpO2 99 %.   Temp (24hrs), Av.9 °F (36.6 °C), Min:97.5 °F (36.4 °C), Max:98.1 °F (36.7 °C)      Pain Control: fair       Meds:  Current Facility-Administered Medications   Medication Dose Route Frequency    sodium chloride flush 0.9 % injection 5-40 mL  5-40 mL IntraVENous 2 times per day    sodium chloride flush 0.9 % injection 5-40 mL  5-40 mL IntraVENous PRN    0.9 % sodium chloride infusion   IntraVENous PRN    0.9 % sodium chloride infusion   IntraVENous Continuous    HYDROmorphone HCl PF (DILAUDID) injection 0.5 mg  0.5 mg IntraVENous Q3H PRN    bisacodyl (DULCOLAX) EC tablet 5 mg  5 mg Oral Daily    aspirin EC tablet 81 mg  81 mg Oral BID    tuberculin injection 5 Units  5 Units IntraDERmal Once    acetaminophen (TYLENOL) tablet 650 mg  650 mg Oral Q4H PRN    mineral oil-hydrophilic petrolatum (AQUAPHOR) ointment   Topical Daily    temazepam (RESTORIL) capsule 15 mg  15 mg Oral Nightly PRN    levothyroxine (SYNTHROID) tablet 88 mcg  88 mcg Oral QAM AC    metoprolol tartrate (LOPRESSOR) tablet 50 mg  50 mg Oral BID    hydroCHLOROthiazide (HYDRODIURIL) tablet 25 mg  25 mg Oral Daily    ondansetron (ZOFRAN-ODT) disintegrating tablet 4 mg  4 mg Oral Q8H PRN    Or    ondansetron (ZOFRAN) injection 4 mg  4 mg IntraVENous Q6H PRN    polyethylene glycol (GLYCOLAX) packet 17 g  17 g Oral Daily PRN    acetaminophen (TYLENOL) suppository 650 mg  650 mg Rectal Q6H PRN        LAB:    Lab Results   Component Value Date/Time    INR 1.2 12/04/2022 06:44 AM     Lab Results   Component Value Date/Time    HGB 7.9 12/06/2022 05:02 AM    HGB 7.4 12/05/2022 05:29 AM    HGB 9.0 12/04/2022 06:44 AM              Physical Exam:  No significant changes, NVI, dressing clean and dry.      Assessment:      Intertrochanteric hip fracture    Plan:     Continue PT/OT/Rehab  Consult: PT          Signed By: Thomas Dumont MD

## 2022-12-06 NOTE — PLAN OF CARE
Problem: Discharge Planning  Goal: Discharge to home or other facility with appropriate resources  12/6/2022 0901 by Tacos Aleman RN  Outcome: Progressing  12/5/2022 2224 by Neema Praada RN  Outcome: Progressing  Flowsheets (Taken 12/5/2022 1930)  Discharge to home or other facility with appropriate resources: Identify barriers to discharge with patient and caregiver     Problem: Pain  Goal: Verbalizes/displays adequate comfort level or baseline comfort level  12/6/2022 0901 by Tacos Aleman RN  Outcome: Progressing  12/5/2022 2224 by Neema Parada RN  Outcome: Progressing  Flowsheets (Taken 12/5/2022 2127)  Verbalizes/displays adequate comfort level or baseline comfort level:   Encourage patient to monitor pain and request assistance   Assess pain using appropriate pain scale     Problem: Skin/Tissue Integrity  Goal: Absence of new skin breakdown  Description: 1. Monitor for areas of redness and/or skin breakdown  2. Assess vascular access sites hourly  3. Every 4-6 hours minimum:  Change oxygen saturation probe site  4. Every 4-6 hours:  If on nasal continuous positive airway pressure, respiratory therapy assess nares and determine need for appliance change or resting period.   12/6/2022 0901 by Tacos Aleman RN  Outcome: Progressing  12/5/2022 2224 by Neema Parada RN  Outcome: Progressing     Problem: Safety - Adult  Goal: Free from fall injury  12/6/2022 0901 by Tacos Aleman RN  Outcome: Progressing  12/5/2022 2224 by Neema Parada RN  Outcome: Progressing     Problem: ABCDS Injury Assessment  Goal: Absence of physical injury  12/6/2022 0901 by Tacos Aleman RN  Outcome: Progressing  12/5/2022 2224 by Neema Parada RN  Outcome: Progressing  Flowsheets (Taken 12/5/2022 1930)  Absence of Physical Injury: Implement safety measures based on patient assessment     Problem: Chronic Conditions and Co-morbidities  Goal: Patient's chronic conditions and co-morbidity symptoms are monitored and maintained or improved  12/6/2022 0901 by Pamela Hoyos RN  Outcome: Progressing  12/5/2022 2224 by Nikunj Silvestre RN  Outcome: Progressing

## 2022-12-06 NOTE — PLAN OF CARE
Problem: Discharge Planning  Goal: Discharge to home or other facility with appropriate resources  12/5/2022 2224 by Malena Kaminski RN  Outcome: Progressing  Flowsheets (Taken 12/5/2022 1930)  Discharge to home or other facility with appropriate resources: Identify barriers to discharge with patient and caregiver  12/5/2022 1241 by Beto Nye RN  Outcome: Progressing     Problem: Pain  Goal: Verbalizes/displays adequate comfort level or baseline comfort level  12/5/2022 2224 by Malena Kaminski RN  Outcome: Progressing  Flowsheets (Taken 12/5/2022 2127)  Verbalizes/displays adequate comfort level or baseline comfort level:   Encourage patient to monitor pain and request assistance   Assess pain using appropriate pain scale  12/5/2022 1241 by Beto Nye RN  Outcome: Progressing     Problem: Skin/Tissue Integrity  Goal: Absence of new skin breakdown  Description: 1. Monitor for areas of redness and/or skin breakdown  2. Assess vascular access sites hourly  3. Every 4-6 hours minimum:  Change oxygen saturation probe site  4. Every 4-6 hours:  If on nasal continuous positive airway pressure, respiratory therapy assess nares and determine need for appliance change or resting period.   12/5/2022 2224 by Malena Kaminski RN  Outcome: Progressing  12/5/2022 1241 by Beto Nye RN  Outcome: Progressing     Problem: Safety - Adult  Goal: Free from fall injury  12/5/2022 2224 by Malena Kaminski RN  Outcome: Progressing  12/5/2022 1241 by Beto Nye RN  Outcome: Progressing     Problem: ABCDS Injury Assessment  Goal: Absence of physical injury  12/5/2022 2224 by Malena Kaminski RN  Outcome: Progressing  Flowsheets (Taken 12/5/2022 1930)  Absence of Physical Injury: Implement safety measures based on patient assessment  12/5/2022 1241 by Beto Nye RN  Outcome: Progressing

## 2022-12-07 LAB
ANION GAP SERPL CALC-SCNC: 1 MMOL/L (ref 2–11)
BASOPHILS # BLD: 0 K/UL (ref 0–0.2)
BASOPHILS NFR BLD: 0 % (ref 0–2)
BUN SERPL-MCNC: 31 MG/DL (ref 8–23)
CALCIUM SERPL-MCNC: 8.4 MG/DL (ref 8.3–10.4)
CHLORIDE SERPL-SCNC: 105 MMOL/L (ref 101–110)
CO2 SERPL-SCNC: 30 MMOL/L (ref 21–32)
CREAT SERPL-MCNC: 1 MG/DL (ref 0.6–1)
DIFFERENTIAL METHOD BLD: ABNORMAL
EOSINOPHIL # BLD: 0.1 K/UL (ref 0–0.8)
EOSINOPHIL NFR BLD: 1 % (ref 0.5–7.8)
ERYTHROCYTE [DISTWIDTH] IN BLOOD BY AUTOMATED COUNT: 15.6 % (ref 11.9–14.6)
GLUCOSE SERPL-MCNC: 96 MG/DL (ref 65–100)
HCT VFR BLD AUTO: 24.5 % (ref 35.8–46.3)
HGB BLD-MCNC: 7.8 G/DL (ref 11.7–15.4)
IMM GRANULOCYTES # BLD AUTO: 0.1 K/UL (ref 0–0.5)
IMM GRANULOCYTES NFR BLD AUTO: 1 % (ref 0–5)
LYMPHOCYTES # BLD: 1.9 K/UL (ref 0.5–4.6)
LYMPHOCYTES NFR BLD: 23 % (ref 13–44)
MCH RBC QN AUTO: 28.6 PG (ref 26.1–32.9)
MCHC RBC AUTO-ENTMCNC: 31.8 G/DL (ref 31.4–35)
MCV RBC AUTO: 89.7 FL (ref 82–102)
MM INDURATION, POC: 0 MM (ref 0–5)
MONOCYTES # BLD: 0.8 K/UL (ref 0.1–1.3)
MONOCYTES NFR BLD: 9 % (ref 4–12)
NEUTS SEG # BLD: 5.6 K/UL (ref 1.7–8.2)
NEUTS SEG NFR BLD: 66 % (ref 43–78)
NRBC # BLD: 0 K/UL (ref 0–0.2)
PLATELET # BLD AUTO: 233 K/UL (ref 150–450)
PMV BLD AUTO: 9.1 FL (ref 9.4–12.3)
POTASSIUM SERPL-SCNC: 3.7 MMOL/L (ref 3.5–5.1)
PPD, POC: NEGATIVE
RBC # BLD AUTO: 2.73 M/UL (ref 4.05–5.2)
SARS-COV-2 RDRP RESP QL NAA+PROBE: NOT DETECTED
SODIUM SERPL-SCNC: 136 MMOL/L (ref 133–143)
SOURCE: NORMAL
WBC # BLD AUTO: 8.4 K/UL (ref 4.3–11.1)

## 2022-12-07 PROCEDURE — 1100000000 HC RM PRIVATE

## 2022-12-07 PROCEDURE — 6370000000 HC RX 637 (ALT 250 FOR IP): Performed by: ORTHOPAEDIC SURGERY

## 2022-12-07 PROCEDURE — 2580000003 HC RX 258: Performed by: FAMILY MEDICINE

## 2022-12-07 PROCEDURE — 6370000000 HC RX 637 (ALT 250 FOR IP): Performed by: FAMILY MEDICINE

## 2022-12-07 PROCEDURE — 87635 SARS-COV-2 COVID-19 AMP PRB: CPT

## 2022-12-07 PROCEDURE — 97116 GAIT TRAINING THERAPY: CPT

## 2022-12-07 PROCEDURE — 2580000003 HC RX 258: Performed by: ORTHOPAEDIC SURGERY

## 2022-12-07 PROCEDURE — 80048 BASIC METABOLIC PNL TOTAL CA: CPT

## 2022-12-07 PROCEDURE — 6370000000 HC RX 637 (ALT 250 FOR IP): Performed by: STUDENT IN AN ORGANIZED HEALTH CARE EDUCATION/TRAINING PROGRAM

## 2022-12-07 PROCEDURE — 97535 SELF CARE MNGMENT TRAINING: CPT

## 2022-12-07 PROCEDURE — 36415 COLL VENOUS BLD VENIPUNCTURE: CPT

## 2022-12-07 PROCEDURE — 97530 THERAPEUTIC ACTIVITIES: CPT

## 2022-12-07 PROCEDURE — 85025 COMPLETE CBC W/AUTO DIFF WBC: CPT

## 2022-12-07 RX ORDER — ACETAMINOPHEN 500 MG
1000 TABLET ORAL EVERY 8 HOURS SCHEDULED
Status: DISCONTINUED | OUTPATIENT
Start: 2022-12-07 | End: 2022-12-08 | Stop reason: HOSPADM

## 2022-12-07 RX ORDER — TRAMADOL HYDROCHLORIDE 50 MG/1
25 TABLET ORAL ONCE
Status: COMPLETED | OUTPATIENT
Start: 2022-12-07 | End: 2022-12-07

## 2022-12-07 RX ADMIN — LEVOTHYROXINE SODIUM 88 MCG: 0.09 TABLET ORAL at 05:24

## 2022-12-07 RX ADMIN — SODIUM CHLORIDE, PRESERVATIVE FREE 5 ML: 5 INJECTION INTRAVENOUS at 20:33

## 2022-12-07 RX ADMIN — ASPIRIN 81 MG: 81 TABLET ORAL at 08:12

## 2022-12-07 RX ADMIN — HYDROCHLOROTHIAZIDE 25 MG: 25 TABLET ORAL at 08:12

## 2022-12-07 RX ADMIN — SODIUM CHLORIDE, PRESERVATIVE FREE 10 ML: 5 INJECTION INTRAVENOUS at 08:12

## 2022-12-07 RX ADMIN — TRAMADOL HYDROCHLORIDE 25 MG: 50 TABLET ORAL at 09:04

## 2022-12-07 RX ADMIN — ASPIRIN 81 MG: 81 TABLET ORAL at 20:30

## 2022-12-07 RX ADMIN — ACETAMINOPHEN 1000 MG: 500 TABLET ORAL at 20:30

## 2022-12-07 RX ADMIN — ACETAMINOPHEN 650 MG: 325 TABLET ORAL at 06:23

## 2022-12-07 RX ADMIN — ACETAMINOPHEN 1000 MG: 500 TABLET ORAL at 13:35

## 2022-12-07 RX ADMIN — BISACODYL 5 MG: 5 TABLET, COATED ORAL at 08:12

## 2022-12-07 RX ADMIN — TEMAZEPAM 15 MG: 15 CAPSULE ORAL at 20:30

## 2022-12-07 RX ADMIN — Medication: at 08:12

## 2022-12-07 RX ADMIN — METOPROLOL TARTRATE 50 MG: 50 TABLET, FILM COATED ORAL at 08:12

## 2022-12-07 RX ADMIN — METOPROLOL TARTRATE 50 MG: 50 TABLET, FILM COATED ORAL at 20:30

## 2022-12-07 ASSESSMENT — PAIN DESCRIPTION - PAIN TYPE: TYPE: SURGICAL PAIN

## 2022-12-07 ASSESSMENT — PAIN SCALES - GENERAL
PAINLEVEL_OUTOF10: 3
PAINLEVEL_OUTOF10: 0
PAINLEVEL_OUTOF10: 3
PAINLEVEL_OUTOF10: 8

## 2022-12-07 ASSESSMENT — PAIN DESCRIPTION - ONSET: ONSET: GRADUAL

## 2022-12-07 ASSESSMENT — PAIN DESCRIPTION - LOCATION
LOCATION: LEG
LOCATION: HIP
LOCATION: LEG

## 2022-12-07 ASSESSMENT — PAIN DESCRIPTION - DESCRIPTORS
DESCRIPTORS: CRAMPING
DESCRIPTORS: ACHING
DESCRIPTORS: ACHING

## 2022-12-07 ASSESSMENT — PAIN DESCRIPTION - FREQUENCY: FREQUENCY: INTERMITTENT

## 2022-12-07 ASSESSMENT — PAIN DESCRIPTION - ORIENTATION
ORIENTATION: RIGHT

## 2022-12-07 ASSESSMENT — PAIN - FUNCTIONAL ASSESSMENT: PAIN_FUNCTIONAL_ASSESSMENT: PREVENTS OR INTERFERES SOME ACTIVE ACTIVITIES AND ADLS

## 2022-12-07 NOTE — PROGRESS NOTES
Orthopedic Joint Progress Note    2022  Admit Date: 2022  Admit Diagnosis: Closed right hip fracture, initial encounter ([facility] [address] [S72.001A]    2 Days Post-Op    Subjective:     Towana Honor alert, oriented and mild pain. Good rehab potential per PT. Review of Systems: unchanged    Objective:     PT/OT: se notes    PATIENT MOBILITY:  fair                           Vital Signs:    Blood pressure 113/63, pulse 84, temperature 97.9 °F (36.6 °C), temperature source Oral, resp. rate 18, height 4' 11\" (1.499 m), weight 102 lb (46.3 kg), SpO2 98 %.   Temp (24hrs), Av.8 °F (36.6 °C), Min:97.3 °F (36.3 °C), Max:98.2 °F (36.8 °C)      Pain Control: fair       Meds:  Current Facility-Administered Medications   Medication Dose Route Frequency    acetaminophen (TYLENOL) tablet 1,000 mg  1,000 mg Oral 3 times per day    sodium chloride flush 0.9 % injection 5-40 mL  5-40 mL IntraVENous 2 times per day    sodium chloride flush 0.9 % injection 5-40 mL  5-40 mL IntraVENous PRN    0.9 % sodium chloride infusion   IntraVENous PRN    temazepam (RESTORIL) capsule 15 mg  15 mg Oral Nightly    sodium chloride flush 0.9 % injection 5-40 mL  5-40 mL IntraVENous 2 times per day    sodium chloride flush 0.9 % injection 5-40 mL  5-40 mL IntraVENous PRN    0.9 % sodium chloride infusion   IntraVENous PRN    HYDROmorphone HCl PF (DILAUDID) injection 0.5 mg  0.5 mg IntraVENous Q3H PRN    bisacodyl (DULCOLAX) EC tablet 5 mg  5 mg Oral Daily    aspirin EC tablet 81 mg  81 mg Oral BID    mineral oil-hydrophilic petrolatum (AQUAPHOR) ointment   Topical Daily    temazepam (RESTORIL) capsule 15 mg  15 mg Oral Nightly PRN    levothyroxine (SYNTHROID) tablet 88 mcg  88 mcg Oral QAM AC    metoprolol tartrate (LOPRESSOR) tablet 50 mg  50 mg Oral BID    hydroCHLOROthiazide (HYDRODIURIL) tablet 25 mg  25 mg Oral Daily    ondansetron (ZOFRAN-ODT) disintegrating tablet 4 mg  4 mg Oral Q8H PRN    Or    ondansetron (ZOFRAN) injection 4 mg 4 mg IntraVENous Q6H PRN    polyethylene glycol (GLYCOLAX) packet 17 g  17 g Oral Daily PRN        LAB:    Lab Results   Component Value Date/Time    INR 1.2 12/04/2022 06:44 AM     Lab Results   Component Value Date/Time    HGB 7.8 12/07/2022 05:43 AM    HGB 7.9 12/06/2022 05:02 AM    HGB 7.4 12/05/2022 05:29 AM              Physical Exam:  No significant changes, Mild pain posterior thigh. Legs wrapped for edema. Assessment:      Principal Problem:    Closed right hip fracture, initial encounter [facility])  Active Problems:    Anxiety    Hypothyroidism    Palpitations    HTN (hypertension)    CHF (congestive heart failure) (HCC)    Pacemaker    Pulmonary hypertension (HCC)  Resolved Problems:    * No resolved hospital problems. *       Plan:cont rehab and possible transfer if accepted.      Continue PT/OT/Rehab  Consult: PT          Signed By: Christiana Rosa MD

## 2022-12-07 NOTE — PROGRESS NOTES
ACUTE PHYSICAL THERAPY GOALS:   (Developed with and agreed upon by patient and/or caregiver.)     (1.) Luisa Nicole  will move from supine to sit and sit to supine  with MINIMAL ASSIST within 7 treatment day(s). (2.) Luisa Nicole will transfer from bed to chair and chair to bed with MINIMAL ASSIST using the least restrictive device within 7 treatment day(s). (3.) Luisa Nicole will ambulate with MINIMAL ASSIST for 30 feet with the least restrictive device within 7 treatment day(s). (4.) Luisa Pel will perform standing static and dynamic balance activities x 10 minutes with MINIMAL ASSIST to improve safety within 7 treatment day(s). (5.) Luisa Nicole will perform bilateral lower extremity exercises x 20 min for HEP with INDEPENDENCE to improve strength, endurance, and functional mobility within 7 treatment day(s). PHYSICAL THERAPY: Daily Note PM   (Link to Caseload Tracking: PT Visit Days : 2  Time In/Out PT Charge Capture  Rehab Caseload Tracker  Orders    Luisa Nicole is a 80 y.o. female   PRIMARY DIAGNOSIS: Closed right hip fracture, initial encounter ([facility] [address]  Closed right hip fracture, initial encounter ([facility] [address] [S72.001A]  Procedure(s) (LRB):  OPEN REDUCTION INTERNAL FIXATION RIGHT HIP FRACTURE WITH TROCHANTERIC FIXATION  NAIL (Right)  2 Days Post-Op  Inpatient: Payor: MEDICARE / Plan: MEDICARE PART A AND B / Product Type: *No Product type* /     ASSESSMENT:     REHAB RECOMMENDATIONS:   Recommendation to date pending progress:  Setting:  Short-term Rehab    Equipment:    To Be Determined     ASSESSMENT:  Ms. Carlos Manuel Beach was supine upon contact and agreeable to PT; patient was premedicated with pain medicine prior to treatment. Patient performed supine to sit with max assist, additional time, and cues for technique. Patient transferred to standing with mod assist x 2 and cues for hand placement.  Patient then performed gait training x 2' to recliner chair then an additional 4' with rolling walker, mod-max assist +chair follow, and cues for posture/walker manipulation. Right knee buckling seemed improved today. Patient was then positioned for comfort in recliner chair. Steady progress. Will continue PT efforts. SUBJECTIVE:   Ms. Santiago Guidry states, \"Do I have to walk anymore? \"     Social/Functional Lives With: Alone  Type of Home: Assisted living  Home Equipment: [place], [address] [facility] Help From: Other (comment), Family  ADL Assistance: Needs assistance  Bath:  (needs assist with sponge bathing due to [employer] wounds\)  Homemaking Assistance: Independent (basic cooking & housekeeping)  Ambulation Assistance: Needs assistance (cane or rollator)  OBJECTIVE:     PAIN: Marshal Telephone / O2: Karen Jana / Peyton Gum / Fahad Rein:   Pre Treatment:   Pain Assessment: None - Denies Pain      Post Treatment: 0 Vitals        Oxygen    [place]    RESTRICTIONS/PRECAUTIONS:  Restrictions/Precautions  Restrictions/Precautions: Fall Risk  Restrictions/Precautions: Fall Risk     MOBILITY: I Mod I S SBA CGA Min Mod Max Total  NT x2 Comments:   Bed Mobility    Rolling [] [] [] [] [] [] [] [] [] [] []    Supine to Sit [] [] [] [] [] [] [] [x] [] [] []    Scooting [] [] [] [] [] [] [] [] [] [] []    Sit to Supine [] [] [] [] [] [] [] [] [] [] []    Transfers    Sit to Stand [] [] [] [] [] [] [x] [] [] [] [x]    Bed to Chair [] [] [] [] [] [] [x] [x] [] [] []    Stand to Sit [] [] [] [] [] [] [x] [] [] [] [x]     [] [] [] [] [] [] [] [] [] [] []    I=Independent, Mod I=Modified Independent, S=Supervision, SBA=Standby Assistance, CGA=Contact Guard Assistance,   Min=Minimal Assistance, Mod=Moderate Assistance, Max=Maximal Assistance, Total=Total Assistance, NT=Not Tested    BALANCE: Good Fair+ Fair Fair- Poor NT Comments   Sitting Static [x] [] [] [] [] []    Sitting Dynamic [] [x] [] [] [] []              Standing Static [] [] [] [] [x] []    Standing Dynamic [] [] [] [] [x] []      GAIT: I Mod I S SBA CGA Min Mod Max Total  NT x2 Comments:   Level of Assistance [] [] [] [] [] [] [x] [x] [] [] [] +chair follow   Distance 2' then 4'    DME Gait Belt and Rolling Walker    Gait Quality Antalgic, Decreased adin , Decreased step clearance, Decreased step length, Decreased stance, Trunk flexion, and R knee buckling    Weightbearing Status      Stairs      I=Independent, Mod I=Modified Independent, S=Supervision, SBA=Standby Assistance, CGA=Contact Guard Assistance,   Min=Minimal Assistance, Mod=Moderate Assistance, Max=Maximal Assistance, Total=Total Assistance, NT=Not Tested    PLAN:   FREQUENCY AND DURATION: BID for duration of hospital stay or until stated goals are met, whichever comes first.    TREATMENT:   TREATMENT:   Therapeutic Activity (10 Minutes): Therapeutic activity included Supine to Sit, Scooting, Transfer Training, Ambulation on level ground, Sitting balance , and Standing balance to improve functional Activity tolerance, Balance, Mobility, and Strength. Gait Training (15 Minutes): Gait training for 2' then 4' feet utilizing [employer]. Patient required Manual, Tactile, Verbal, and Visual cueing to improve Assistive Device Utilization, Dynamic Standing Balance, and Gait Mechanics. TREATMENT GRID:  N/A    AFTER TREATMENT PRECAUTIONS: Alarm Activated, Bed/Chair Locked, Call light within reach, Chair, Needs within reach, and RN notified    INTERDISCIPLINARY COLLABORATION:  RN/ PCT, PT/ PTA, and Rehab Attendant    EDUCATION:      TIME IN/OUT:  Time In: 0920  Time Out: 0945  Minutes: [address] Ellsworth PTA

## 2022-12-07 NOTE — PROGRESS NOTES
ACUTE PHYSICAL THERAPY GOALS:   (Developed with and agreed upon by patient and/or caregiver.)     (1.) Brianna Bree  will move from supine to sit and sit to supine  with MINIMAL ASSIST within 7 treatment day(s). (2.) Brianna Borne will transfer from bed to chair and chair to bed with MINIMAL ASSIST using the least restrictive device within 7 treatment day(s). (3.) Brianna Giron will ambulate with MINIMAL ASSIST for 30 feet with the least restrictive device within 7 treatment day(s). (4.) Brianna Borne will perform standing static and dynamic balance activities x 10 minutes with MINIMAL ASSIST to improve safety within 7 treatment day(s). (5.) Brianna Borne will perform bilateral lower extremity exercises x 20 min for HEP with INDEPENDENCE to improve strength, endurance, and functional mobility within 7 treatment day(s). PHYSICAL THERAPY: Daily Note PM   (Link to Caseload Tracking: PT Visit Days : 2  Time In/Out PT Charge Capture  Rehab Caseload Tracker  Orders  RLE WBAT    Brianna Giron is a 80 y.o. female   PRIMARY DIAGNOSIS: Closed right hip fracture, initial encounter ([facility]  Closed [facility] fracture, [facility] [S72.001A]  Procedure(s) (LRB):  OPEN REDUCTION INTERNAL FIXATION RIGHT HIP FRACTURE WITH TROCHANTERIC FIXATION  NAIL (Right)  2 Days Post-Op  Inpatient: Payor: MEDICARE / Plan: MEDICARE PART A AND B / Product Type: *No Product type* /     ASSESSMENT:     REHAB RECOMMENDATIONS:   Recommendation to date pending progress:  Setting:  Short-term Rehab    Equipment:    To Be Determined     ASSESSMENT:  Ms. Joyner was sitting up in recliner chair upon contact and agreeable to PT. Patient participated in [address] [place] [address] [employer] while seated in recliner chair with cues for improved/proper technique and quality of exercise. Patient then transferred to standing with mod assist x 2 and cues for technique/hand placement.  Once standing, attempted gait training but patient had more difficulty with walker management, balance and advancing LE's compared to this morning and ultimately required SPT back to bed with max assist x 2. Patient returned to supine with max assist x 2 where she rolled left and right with mod assist to allow for brief change. Overall steady progress. Will continue PT efforts.      SUBJECTIVE:   Ms. Joyner states, \"Thank you so much\"     Social/Functional Lives With: Alone  Type of Home: Assisted living  Home Equipment: [place], [address] [facility] Help From: Other (comment), Family  ADL Assistance: Needs assistance  Bath:  (needs assist with sponge bathing due to [employer] wounds\)  Homemaking Assistance: Independent (basic cooking & housekeeping)  Ambulation Assistance: Needs assistance (cane or rollator)  OBJECTIVE:     PAIN: Lindaann Left / O2: [place] Other / Luana Scrape / DRAINS:   Pre Treatment:   Pain Assessment: None - Denies Pain      Post Treatment: 0 Vitals        Oxygen    None    RESTRICTIONS/PRECAUTIONS:  Restrictions/Precautions  Restrictions/Precautions: Fall Risk  Restrictions/Precautions: Fall Risk     MOBILITY: I Mod I S SBA CGA Min Mod Max Total  NT x2 Comments:   Bed Mobility    Rolling [] [] [] [] [] [] [x] [] [] [] []    Supine to Sit [] [] [] [] [] [] [] [] [] [] []    Scooting [] [] [] [] [] [] [] [] [] [] []    Sit to Supine [] [] [] [] [] [] [] [x] [] [] [x]    Transfers    Sit to Stand [] [] [] [] [] [] [x] [] [] [] [x]    Bed to Chair [] [] [] [] [] [] [] [x] [] [] [x]    Stand to Sit [] [] [] [] [] [] [] [x] [] [] [x]     [] [] [] [] [] [] [] [] [] [] []    I=Independent, Mod I=Modified Independent, S=Supervision, SBA=Standby Assistance, CGA=Contact Guard Assistance,   Min=Minimal Assistance, Mod=Moderate Assistance, Max=Maximal Assistance, Total=Total Assistance, NT=Not Tested    BALANCE: Good Fair+ Fair Fair- Poor NT Comments   Sitting Static [x] [] [] [] [] []    Sitting Dynamic [] [x] [] [] [] []              Standing Static [] [] [] [] [x] []    Standing Dynamic [] [] [] [] [x] []      GAIT: I Mod I S SBA CGA Min Mod Max Total  NT x2 Comments:   Level of Assistance [] [] [] [] [] [] [] [x] [] [] [x]    Distance SPT back to bed    DME Gait Belt and Rolling Walker    Gait Quality Antalgic, Decreased adin , Decreased step clearance, Decreased step length, Decreased stance, Trunk flexion, and R knee buckling    Weightbearing Status      Stairs      I=Independent, Mod I=Modified Independent, S=Supervision, SBA=Standby Assistance, CGA=Contact Guard Assistance,   Min=Minimal Assistance, Mod=Moderate Assistance, Max=Maximal Assistance, Total=Total Assistance, NT=Not Tested    PLAN:   FREQUENCY AND DURATION: BID for duration of hospital stay or until stated goals are met, whichever comes first.    TREATMENT:   TREATMENT:   Therapeutic Activity (25 Minutes): Therapeutic activity included Rolling, Supine to Sit, Scooting, Transfer Training, Ambulation on level ground, Sitting balance , Standing balance, and AAROM exercises to BLE's to improve functional Activity tolerance, Balance, Mobility, and Strength. TREATMENT GRID:  N/A    AFTER TREATMENT PRECAUTIONS: Alarm Activated, Bed, Bed/Chair Locked, Call light within reach, Needs within reach, RN notified, and Side rails x3    INTERDISCIPLINARY COLLABORATION:  RN/ PCT, PT/ PTA, and Rehab Attendant    EDUCATION:      TIME IN/OUT:  Time In: 1225  Time Out: [address]  Minutes: [address]  BETTY Ellsworth

## 2022-12-07 NOTE — PROGRESS NOTES
Hospitalist Progress Note   Admit Date:  2022 10:00 PM   Name:  Roddy Gilford   Age:  80 y.o. Sex:  female  :  3/11/1926   MRN:  [id number]   Room:  4/    Presenting Complaint: No chief complaint on file. Reason(s) for Admission: Closed right hip fracture, initial encounter [facility]) [address] [place] [facility] Course:   Patient is a 79 y/o female with medical history of chronic CHF (EF 62% in ), SSS s/p PPM, pAF on Eliquis, hypothyroidism, pulmonary hypertension who presented to ED from assisted living facility s/p mechanical fall with right hip pain. In ED, Hgb 9.0, CR 1.02. UA unremarkable. CT head and CXR no acute findings. XR hip shows acute, comminuted, displaced right hip intertrochanteric fracture. Patient was given morphine, Zofran in ED. Patient was transferred from [facility] to [facility] for further intervention. Patient underwent R intertrochanteric proximal femur fracture  with Dr. Mary Perez, Orthopedic Surgery. PT/OT ongoing, rehab at discharge. Subjective & 24hr Events (22): Patient alert and oriented, sitting upright in bed. She ate a good breakfast. Her RLE is having significant paint this morning. Denies chest pain and SOB. She is oriented x 3 at rounding. I spoke with son, Rachele Live by phone.     Assessment & Plan:     Closed right hip fracture, initial encounter [facility])  -s/p surgical repair  with Orthopedics  -Cont asa 81 mg po BID per Ortho  -PT/OT ongoing, rehab at discharge  -Try Tramadol x 1 for increased pain, half dose, discussed with patient and family, will schedule tylenol as well    Hypothyroidism  -Cont synthroid    Chronic CHF / SSS s/p PPM / Hypertension / pAF on long term AC  -Eliquis was held for surgery, resume when cleared by Ortho, cont BB and HCTZ    Pulmonary hypertension ([facility]  -Noted    Discharge planning: Rehab 24-48 hours    Diet:  ADULT ORAL NUTRITION SUPPLEMENT; AM Snack, PM Snack; Standard High Calorie/High Protein Oral Supplement  ADULT DIET; Regular; Low Fat/Low Chol/High Fiber/2 gm Na  DVT PPx: ASA  Code status: DNR    Hospital Problems:  Principal Problem:    Closed right hip fracture, initial encounter [facility])  Active Problems:    Anxiety    Hypothyroidism    Palpitations    HTN (hypertension)    CHF (congestive heart failure) ([facility])    Pacemaker    Pulmonary hypertension ([facility]  Resolved Problems:    * No resolved hospital problems. *      Objective:   Patient Vitals for the past 24 hrs:   Temp Pulse Resp BP SpO2   12/07/22 1047 97.9 °F (36.6 °C) 84 18 113/63 98 %   12/07/22 0904 -- -- 18 -- --   12/07/22 0707 98.2 °F (36.8 °C) 75 19 132/66 95 %   12/07/22 0339 97.3 °F (36.3 °C) 84 16 124/67 98 %   12/06/22 2320 97.5 °F (36.4 °C) 79 17 112/60 96 %   12/06/22 1942 98.2 °F (36.8 °C) 78 17 (!) 109/59 95 %   12/06/22 1520 97.4 °F (36.3 °C) 76 18 (!) 119/57 98 %       Oxygen Therapy  SpO2: 98 %  Pulse Oximeter Device Mode: Intermittent  Pulse Oximeter Device Location: Right, Finger  O2 Device: None (Room air)  O2 Flow Rate (L/min): 3 L/min    Estimated body mass index is 20.6 kg/m² as calculated from the following:    Height as of this encounter: 4' 11\" (1.499 m). Weight as of this encounter: 102 lb (46.3 kg). Intake/Output Summary (Last 24 hours) at 12/7/2022 1359  Last data filed at 12/7/2022 0533  Gross per 24 hour   Intake --   Output 600 ml   Net -600 ml         Physical Exam:     Blood pressure 113/63, pulse 84, temperature 97.9 °F (36.6 °C), temperature source Oral, resp. rate 18, height 4' 11\" (1.499 m), weight 102 lb (46.3 kg), SpO2 98 %. General:    Alert, frail, no acute distress, oriented, calm  Head:  Normocephalic, atraumatic  Eyes:  Sclerae appear normal.  Pupils equally round. ENT:  Nares appear normal, no drainage. Moist oral mucosa  Neck:  No restricted ROM. Trachea midline   CV:   RRR. PPM intact to left chest. No jugular venous distension. Lungs:   CTAB. No wheezing, rhonchi. Resp even and unlabored on RA  Abdomen:    Bowel sounds present. Soft, nontender, nondistended. Extremities: No cyanosis or clubbing. RLE surgical dressing C/D/I, mild swelling distal through thigh. Skin:     No rashes and normal coloration. Warm and dry. BLE wrapped per wound care, sensation and pulses intact to bilateral feet  Neuro:  CN II-XII grossly intact. Sensation intact. A&Ox3. No focal deficits  Psych:  Normal mood and affect.       I have personally reviewed labs and tests showing:  Recent Labs:  Recent Results (from the past 48 hour(s))   PLEASE READ & DOCUMENT PPD TEST IN 24 HRS    Collection Time: 12/05/22  5:19 PM   Result Value Ref Range    PPD, (POC) Negative Negative    mm Induration 0 0 - 5 mm   Hemoglobin and Hematocrit    Collection Time: 12/06/22  5:02 AM   Result Value Ref Range    Hemoglobin 7.9 (L) 11.7 - 15.4 g/dL    Hematocrit 24.2 (L) 35.8 - 46.3 %   PLEASE READ & DOCUMENT PPD TEST IN 48 HRS    Collection Time: 12/06/22  5:09 PM   Result Value Ref Range    PPD, (POC) Negative Negative    mm Induration 0 0 - 5 mm   Basic Metabolic Panel w/ Reflex to MG    Collection Time: 12/07/22  5:43 AM   Result Value Ref Range    Sodium 136 133 - 143 mmol/L    Potassium 3.7 3.5 - 5.1 mmol/L    Chloride 105 101 - 110 mmol/L    CO2 30 21 - 32 mmol/L    Anion Gap 1 (L) 2 - 11 mmol/L    Glucose 96 65 - 100 mg/dL    BUN 31 (H) 8 - 23 MG/DL    Creatinine 1.00 0.6 - 1.0 MG/DL    Est, Glom Filt Rate 52 (L) >60 ml/min/1.73m2    Calcium 8.4 8.3 - 10.4 MG/DL   CBC with Auto Differential    Collection Time: 12/07/22  5:43 AM   Result Value Ref Range    WBC 8.4 4.3 - 11.1 K/uL    RBC 2.73 (L) 4.05 - 5.2 M/uL    Hemoglobin 7.8 (L) 11.7 - 15.4 g/dL    Hematocrit 24.5 (L) 35.8 - 46.3 %    MCV 89.7 82 - 102 FL    MCH 28.6 26.1 - 32.9 PG    MCHC 31.8 31.4 - 35.0 g/dL    RDW 15.6 (H) 11.9 - 14.6 %    Platelets 873 435 - 342 K/uL    MPV 9.1 (L) 9.4 - 12.3 FL    nRBC 0.00 0.0 - 0.2 K/uL    Differential Type AUTOMATED      Seg Neutrophils 66 43 - 78 % Lymphocytes 23 13 - 44 %    Monocytes 9 4.0 - 12.0 %    Eosinophils % 1 0.5 - 7.8 %    Basophils 0 0.0 - 2.0 %    Immature Granulocytes 1 0.0 - 5.0 %    Segs Absolute 5.6 1.7 - 8.2 K/UL    Absolute Lymph # 1.9 0.5 - 4.6 K/UL    Absolute Mono # 0.8 0.1 - 1.3 K/UL    Absolute Eos # 0.1 0.0 - 0.8 K/UL    Basophils Absolute 0.0 0.0 - 0.2 K/UL    Absolute Immature Granulocyte 0.1 0.0 - 0.5 K/UL       I have personally reviewed imaging studies showing: Other Studies:  XR FEMUR RIGHT (MIN 2 VIEWS)   Final Result   Status post ORIF of the right femur without obvious complication. Total fluoroscopy time: 50 seconds; six fluoroscopic spot images saved.       NC XR TECHNOLOGIST SERVICE   Final Result          Current Meds:  Current Facility-Administered Medications   Medication Dose Route Frequency    acetaminophen (TYLENOL) tablet 1,000 mg  1,000 mg Oral 3 times per day    sodium chloride flush 0.9 % injection 5-40 mL  5-40 mL IntraVENous 2 times per day    sodium chloride flush 0.9 % injection 5-40 mL  5-40 mL IntraVENous PRN    0.9 % sodium chloride infusion   IntraVENous PRN    temazepam (RESTORIL) capsule 15 mg  15 mg Oral Nightly    sodium chloride flush 0.9 % injection 5-40 mL  5-40 mL IntraVENous 2 times per day    sodium chloride flush 0.9 % injection 5-40 mL  5-40 mL IntraVENous PRN    0.9 % sodium chloride infusion   IntraVENous PRN    HYDROmorphone HCl PF (DILAUDID) injection 0.5 mg  0.5 mg IntraVENous Q3H PRN    bisacodyl (DULCOLAX) EC tablet 5 mg  5 mg Oral Daily    aspirin EC tablet 81 mg  81 mg Oral BID    mineral oil-hydrophilic petrolatum (AQUAPHOR) ointment   Topical Daily    temazepam (RESTORIL) capsule 15 mg  15 mg Oral Nightly PRN    levothyroxine (SYNTHROID) tablet 88 mcg  88 mcg Oral QAM AC    metoprolol tartrate (LOPRESSOR) tablet 50 mg  50 mg Oral BID    hydroCHLOROthiazide (HYDRODIURIL) tablet 25 mg  25 mg Oral Daily    ondansetron (ZOFRAN-ODT) disintegrating tablet 4 mg  4 mg Oral Q8H PRN    Or ondansetron (ZOFRAN) injection 4 mg  4 mg IntraVENous Q6H PRN    polyethylene glycol (GLYCOLAX) packet 17 g  17 g Oral Daily PRN     Signed: Castillo ACOSTA-BC

## 2022-12-07 NOTE — PLAN OF CARE
Problem: Discharge Planning  Goal: Discharge to home or other facility with appropriate resources  12/6/2022 1921 by Breana Mccann RN  Outcome: Progressing  Flowsheets (Taken 12/6/2022 1912)  Discharge to home or other facility with appropriate resources: Identify barriers to discharge with patient and caregiver  12/6/2022 0901 by Augustin Farnsworth RN  Outcome: Progressing     Problem: Pain  Goal: Verbalizes/displays adequate comfort level or baseline comfort level  12/6/2022 1921 by Breana Mccann RN  Outcome: Progressing  Flowsheets (Taken 12/6/2022 1912)  Verbalizes/displays adequate comfort level or baseline comfort level:   Encourage patient to monitor pain and request assistance   Assess pain using appropriate pain scale  12/6/2022 0901 by Augustin Farnsworth RN  Outcome: Progressing     Problem: Skin/Tissue Integrity  Goal: Absence of new skin breakdown  Description: 1. Monitor for areas of redness and/or skin breakdown  2. Assess vascular access sites hourly  3. Every 4-6 hours minimum:  Change oxygen saturation probe site  4. Every 4-6 hours:  If on nasal continuous positive airway pressure, respiratory therapy assess nares and determine need for appliance change or resting period.   12/6/2022 1921 by Breana Mccann RN  Outcome: Progressing  12/6/2022 0901 by Augustin Farnsworth RN  Outcome: Progressing     Problem: Safety - Adult  Goal: Free from fall injury  12/6/2022 1921 by Breana Mccann RN  Outcome: Progressing  12/6/2022 0901 by Augustin Farnsworth RN  Outcome: Progressing     Problem: ABCDS Injury Assessment  Goal: Absence of physical injury  12/6/2022 1921 by Breana Mccann RN  Outcome: Progressing  Flowsheets (Taken 12/6/2022 1912)  Absence of Physical Injury: Implement safety measures based on patient assessment  12/6/2022 0901 by Augustin Farnsworth RN  Outcome: Progressing     Problem: Chronic Conditions and Co-morbidities  Goal: Patient's chronic conditions and co-morbidity symptoms are monitored and maintained or improved  12/6/2022 1921 by Breana Mccann RN  Outcome: Progressing  12/6/2022 0901 by Augustin Farnsworth RN  Outcome: Progressing

## 2022-12-07 NOTE — PROGRESS NOTES
ACUTE OCCUPATIONAL THERAPY GOALS:   (Developed with and agreed upon by patient and/or caregiver.)  1. Patient will complete self feeding and grooming with setup. 2. Patient will complete functional transfers with moderate assistance while maintaining WBAT  status in RLE and with adaptive equipment as needed. 3. Patient will complete lower body bathing and dressing with moderate assistance and adaptive equipment as needed. 4. Patient will complete toileting and toilet transfer with moderate assistance. 5. Patient will tolerate 30 minutes of OT treatment with as needed rest breaks to increase activity tolerance for ADLs. 6. Patient will participate in at least 20 minutes of BUE therapeutic exercises to strengthen BUE for functional transfers. OCCUPATIONAL THERAPY: Daily Note AM   OT Visit Days: 2   Time In/Out  OT Charge Capture  Rehab Caseload Tracker  OT Orders    Brianna Giron is a 80 y.o. female   PRIMARY DIAGNOSIS: Closed right hip fracture, initial encounter ([facility]  Closed right hip fracture, initial encounter ([facility] [S72.001A]  Procedure(s) (LRB):  OPEN REDUCTION INTERNAL FIXATION RIGHT HIP FRACTURE WITH TROCHANTERIC FIXATION  NAIL (Right)  2 Days Post-Op  Inpatient: Payor: MEDICARE / Plan: MEDICARE PART A AND B / Product Type: *No Product type* /     ASSESSMENT:     REHAB RECOMMENDATIONS: CURRENT LEVEL OF FUNCTION:  (Most Recently Demonstrated)   Recommendation to date pending progress:  Setting:  Short-term Rehab    Equipment:    To Be Determined Bathing:  Minimal Assist  Dressing:  Maximal Assist  Feeding/Grooming:  Supervision/Setup  Toileting:  Not Tested  Functional Mobility:  Not Tested     ASSESSMENT:  Ms. Vic Leal was sitting in chair upon arrival. Pt completed grooming, dressing and bathing with the assistance listed below while sitting in chair at sink. Pt is progressing towards goals. Continue POC. SUBJECTIVE:     Ms. Vic Leal states, \"I will love to get washed off. \" Lactation visit. Patient states breasts feel very full, heavy, and slightly uncomfortable. Engorgement discussed, prevention and treatment strategies review. Attempted to get  to breast in football hold on left side using wash cloth under breast to elevate level of nipple. [place] able to latch easily and a few sucks and swallows noted, right breast dripping colostrum. Only sustained latch for short time and fell asleep again. Pediatrician entered to discuss phototherapy, pumping and supplementing with about 10mL if poor feeding, and continuing  stay until re-assessment of phototherapy and jaundice levels until at least tomorrow. Encouraged patient to call for latching assistance after MD done with assessment, and/or pumping assistance.   Social/Functional Lives With: Alone  Type of Home: Assisted living  Home Equipment: Ruth Christianson, [address] [facility] Help From: Other (comment), Family  ADL Assistance: Needs assistance  Bath:  (needs assist with sponge bathing due to [employer] wounds\)  Homemaking Assistance: Independent (basic cooking & housekeeping)  Ambulation Assistance: Needs assistance (cane or rollator)    OBJECTIVE:     Lexismars Spencer / Ihsanparish Aponte / AIRWAY: IV    RESTRICTIONS/PRECAUTIONS:  Restrictions/Precautions  Restrictions/Precautions: Fall Risk  Lower Extremity Weight Bearing Restrictions  Right Lower Extremity Weight Bearing: Weight Bearing As Tolerated        PAIN: Juliana Rich / O2:   Pre Treatment: 0           Post Treatment: 0 Vitals          Oxygen        MOBILITY: I Mod I S SBA CGA Min Mod Max Total  NT x2 Comments:   Bed Mobility    Rolling [] [] [] [] [] [] [] [] [] [] []    Supine to Sit [] [] [] [] [] [] [] [] [] [] []    Scooting [] [] [] [] [] [] [] [] [] [] []    Sit to Supine [] [] [] [] [] [] [] [] [] [] []    Transfers    Sit to Stand [] [] [] [] [] [] [] [] [] [] []    Bed to Chair [] [] [] [] [] [] [] [] [] [] []    Stand to Sit [] [] [] [] [] [] [] [] [] [] []    Tub/Shower [] [] [] [] [] [] [] [] [] [] []     Toilet [] [] [] [] [] [] [] [] [] [] []      [] [] [] [] [] [] [] [] [] [] []    I=Independent, Mod I=Modified Independent, S=Supervision/Setup, SBA=Standby Assistance, CGA=Contact Guard Assistance, Min=Minimal Assistance, Mod=Moderate Assistance, Max=Maximal Assistance, Total=Total Assistance, NT=Not Tested    ACTIVITIES OF DAILY LIVING: I Mod I S SBA CGA Min Mod Max Total NT Comments   BASIC ADLs:              Upper Body   Bathing [] [] [x] [] [] [] [] [] [] []    Lower Body Bathing [] [] [] [] [] [x] [] [] [] []    Toileting [] [] [] [] [] [] [] [] [] []    Upper Body Dressing [] [] [x] [] [] [] [] [] [] []    Lower Body Dressing [] [] [] [] [] [] [] [x] [] []    Feeding [] [] [] [] [] [] [] [] [] []    Grooming [] [] [x] [] [] [] [] [] [] []    Personal Device Care [] [] [] [] [] [] [] [] [] []    Functional Mobility [] [] [] [] [] [] [] [] [] []    I=Independent, Mod I=Modified Independent, S=Supervision/Setup, SBA=Standby Assistance, CGA=Contact Guard Assistance, Min=Minimal Assistance, Mod=Moderate Assistance, Max=Maximal Assistance, Total=Total Assistance, NT=Not Tested    BALANCE: Good Fair+ Fair Fair- Poor NT Comments   Sitting Static [x] [] [] [] [] []    Sitting Dynamic [] [] [] [] [] []              Standing Static [] [] [] [] [] []    Standing Dynamic [] [] [] [] [] []        PLAN:     FREQUENCY/DURATION   OT Plan of Care: 4 times/week for duration of hospital stay or until stated goals are met, whichever comes first.    TREATMENT:     TREATMENT:   Self Care (25 minutes): Patient participated in upper body bathing, lower body bathing, upper body dressing, lower body dressing, and grooming ADLs in unsupported sitting and standing with minimal verbal and manual cueing to increase independence and decrease assistance required. Patient also participated in energy conservation training to increase independence and decrease assistance required.      TREATMENT GRID:  N/A    AFTER TREATMENT PRECAUTIONS: Alarm Activated, Bed/Chair Locked, Call light within reach, Chair, Needs within reach, and RN notified    INTERDISCIPLINARY COLLABORATION:  RN/ PCT and OT/ QUIROZ    EDUCATION:       TOTAL TREATMENT DURATION AND TIME:  Time In: 1000  Time Out: Lauro Delmi  Minutes: [address]

## 2022-12-07 NOTE — WOUND CARE
Bilateral lower legs improving from admisison, continue present orders of aquaphor, xeroform, ABD's kerlex and ace.

## 2022-12-08 VITALS
TEMPERATURE: 97.3 F | WEIGHT: 102 LBS | SYSTOLIC BLOOD PRESSURE: 114 MMHG | BODY MASS INDEX: 20.56 KG/M2 | HEART RATE: 85 BPM | OXYGEN SATURATION: 100 % | HEIGHT: 59 IN | RESPIRATION RATE: 18 BRPM | DIASTOLIC BLOOD PRESSURE: 67 MMHG

## 2022-12-08 PROBLEM — S72.001A CLOSED RIGHT HIP FRACTURE, INITIAL ENCOUNTER (HCC): Status: RESOLVED | Noted: 2022-12-04 | Resolved: 2022-12-08

## 2022-12-08 LAB
HCT VFR BLD AUTO: 24.5 % (ref 35.8–46.3)
HGB BLD-MCNC: 7.9 G/DL (ref 11.7–15.4)

## 2022-12-08 PROCEDURE — 36415 COLL VENOUS BLD VENIPUNCTURE: CPT

## 2022-12-08 PROCEDURE — 6370000000 HC RX 637 (ALT 250 FOR IP): Performed by: ORTHOPAEDIC SURGERY

## 2022-12-08 PROCEDURE — 6370000000 HC RX 637 (ALT 250 FOR IP): Performed by: STUDENT IN AN ORGANIZED HEALTH CARE EDUCATION/TRAINING PROGRAM

## 2022-12-08 PROCEDURE — 6360000002 HC RX W HCPCS: Performed by: ORTHOPAEDIC SURGERY

## 2022-12-08 PROCEDURE — 2580000003 HC RX 258: Performed by: ORTHOPAEDIC SURGERY

## 2022-12-08 PROCEDURE — 2580000003 HC RX 258: Performed by: FAMILY MEDICINE

## 2022-12-08 PROCEDURE — 85014 HEMATOCRIT: CPT

## 2022-12-08 PROCEDURE — 97530 THERAPEUTIC ACTIVITIES: CPT

## 2022-12-08 RX ORDER — ASPIRIN 81 MG/1
81 TABLET ORAL 2 TIMES DAILY
Qty: 60 TABLET | Refills: 0
Start: 2022-12-08 | End: 2023-01-07

## 2022-12-08 RX ORDER — TEMAZEPAM 15 MG/1
15 CAPSULE ORAL NIGHTLY PRN
Qty: 3 CAPSULE | Refills: 0 | Status: SHIPPED | OUTPATIENT
Start: 2022-12-08 | End: 2022-12-11

## 2022-12-08 RX ORDER — ACETAMINOPHEN 500 MG
1000 TABLET ORAL EVERY 8 HOURS PRN
Qty: 42 TABLET | Refills: 0
Start: 2022-12-08 | End: 2022-12-18

## 2022-12-08 RX ADMIN — LEVOTHYROXINE SODIUM 88 MCG: 0.09 TABLET ORAL at 05:59

## 2022-12-08 RX ADMIN — SODIUM CHLORIDE, PRESERVATIVE FREE 5 ML: 5 INJECTION INTRAVENOUS at 08:40

## 2022-12-08 RX ADMIN — HYDROCHLOROTHIAZIDE 25 MG: 25 TABLET ORAL at 08:39

## 2022-12-08 RX ADMIN — ONDANSETRON 4 MG: 2 INJECTION INTRAMUSCULAR; INTRAVENOUS at 08:38

## 2022-12-08 RX ADMIN — ASPIRIN 81 MG: 81 TABLET ORAL at 08:39

## 2022-12-08 RX ADMIN — BISACODYL 5 MG: 5 TABLET, COATED ORAL at 08:39

## 2022-12-08 RX ADMIN — SODIUM CHLORIDE, PRESERVATIVE FREE 5 ML: 5 INJECTION INTRAVENOUS at 08:39

## 2022-12-08 RX ADMIN — ACETAMINOPHEN 1000 MG: 500 TABLET ORAL at 05:59

## 2022-12-08 RX ADMIN — Medication: at 08:40

## 2022-12-08 RX ADMIN — ACETAMINOPHEN 1000 MG: 500 TABLET ORAL at 13:02

## 2022-12-08 RX ADMIN — METOPROLOL TARTRATE 50 MG: 50 TABLET, FILM COATED ORAL at 08:39

## 2022-12-08 ASSESSMENT — PAIN SCALES - GENERAL: PAINLEVEL_OUTOF10: 0

## 2022-12-08 NOTE — PROGRESS NOTES
ACUTE PHYSICAL THERAPY GOALS:   (Developed with and agreed upon by patient and/or caregiver.)     (1.) Luisa Nicole  will move from supine to sit and sit to supine  with MINIMAL ASSIST within 7 treatment day(s). (2.) Luisa Nicole will transfer from bed to chair and chair to bed with MINIMAL ASSIST using the least restrictive device within 7 treatment day(s). (3.) Luisa Nicole will ambulate with MINIMAL ASSIST for 30 feet with the least restrictive device within 7 treatment day(s). (4.) Luisa Pel will perform standing static and dynamic balance activities x 10 minutes with MINIMAL ASSIST to improve safety within 7 treatment day(s). (5.) Luisa Nicole will perform bilateral lower extremity exercises x 20 min for HEP with INDEPENDENCE to improve strength, endurance, and functional mobility within 7 treatment day(s). PHYSICAL THERAPY: Daily Note PM   (Link to Caseload Tracking: PT Visit Days : 3  Time In/Out PT Charge Capture  Rehab Caseload Tracker  Orders  RLE WBAT    Luisa Nicole is a 80 y.o. female   PRIMARY DIAGNOSIS: Closed right hip fracture, initial encounter ([facility]  Closed right hip fracture, initial encounter ([facility] [S72.001A]  Procedure(s) (LRB):  OPEN REDUCTION INTERNAL FIXATION RIGHT HIP FRACTURE WITH TROCHANTERIC FIXATION  NAIL (Right)  3 Days Post-Op  Inpatient: Payor: MEDICARE / Plan: MEDICARE PART A AND B / Product Type: *No Product type* /     ASSESSMENT:     REHAB RECOMMENDATIONS:   Recommendation to date pending progress:  Setting:  Short-term Rehab    Equipment:    To Be Determined     ASSESSMENT:  Ms. Carlos Manuel Beach was supine upon contact and agreeable to PT. Patient reports feeling nauseated (received zofran prior to treatment). Patient performed supine to sit with max assist, additional time, and cues for technique. Patient then transferred to standing with max assist and cues for hand placement.  Once standing patient demonstrated poor balance, posture, and walker utilization requiring cues to improve. Patient unable to perform gait training or take steps to recliner chair ultimately requiring max assist to perform SPT to recliner chair. Once seated in recliner chair patient performed AAROM to BLE's with cues for improved/proper technique of exercises. Patient then reported she needed to have a BM. Patient performed SPT to and from [facility] with rolling walker and max assist. Patient again had difficulty advancing LE's and demonstrated poor balance/posture in standing. Overall slow progress. Will continue PT efforts.      SUBJECTIVE:   Ms. Carlos Manuel Beach states, \"I feel sick to my stomach\"     Social/Functional Lives With: Alone  Type of Home: Assisted living  Home Equipment: [employer], [address] [facility] Help From: Other (comment), Family  ADL Assistance: Needs assistance  Bath:  (needs assist with sponge bathing due to [facility] wounds\)  Homemaking Assistance: Independent (basic cooking & housekeeping)  Ambulation Assistance: Needs assistance (cane or rollator)  OBJECTIVE:     PAIN: [place] / O2: Ronda Drown / Angela Georgette / DRAINS:   Pre Treatment:   Pain Assessment: None - Denies Pain      Post Treatment: 0 Vitals        Oxygen    None    RESTRICTIONS/PRECAUTIONS:  Restrictions/Precautions  Restrictions/Precautions: Fall Risk  Restrictions/Precautions: Fall Risk     MOBILITY: I Mod I S SBA CGA Min Mod Max Total  NT x2 Comments:   Bed Mobility    Rolling [] [] [] [] [] [] [] [] [] [] []    Supine to Sit [] [] [] [] [] [] [] [x] [] [] []    Scooting [] [] [] [] [] [] [] [] [] [] []    Sit to Supine [] [] [] [] [] [] [] [] [] [] []    Transfers    Sit to Stand [] [] [] [] [] [] [] [x] [] [] []    Bed to Chair [] [] [] [] [] [] [] [x] [] [] []    Stand to Sit [] [] [] [] [] [] [] [x] [] [] []     [] [] [] [] [] [] [] [] [] [] []    I=Independent, Mod I=Modified Independent, S=Supervision, SBA=Standby Assistance, CGA=Contact Guard Assistance,   Min=Minimal Assistance, Mod=Moderate Assistance, Max=Maximal Assistance, Total=Total Assistance, NT=Not Tested    BALANCE: Good Fair+ Fair Fair- Poor NT Comments   Sitting Static [] [] [x] [] [] []    Sitting Dynamic [] [] [] [x] [] []              Standing Static [] [] [] [] [x] []    Standing Dynamic [] [] [] [] [x] []      GAIT: I Mod I S SBA CGA Min Mod Max Total  NT x2 Comments:   Level of Assistance [] [] [] [] [] [] [] [x] [] [] []    Distance SPT - unable to take steps    DME Gait Belt and Rolling Walker    Gait Quality Antalgic, Decreased adin , Decreased step clearance, Decreased step length, Decreased stance, Trunk flexion, and R knee buckling    Weightbearing Status      Stairs      I=Independent, Mod I=Modified Independent, S=Supervision, SBA=Standby Assistance, CGA=Contact Guard Assistance,   Min=Minimal Assistance, Mod=Moderate Assistance, Max=Maximal Assistance, Total=Total Assistance, NT=Not Tested    PLAN:   FREQUENCY AND DURATION: BID for duration of hospital stay or until stated goals are met, whichever comes first.    TREATMENT:   TREATMENT:   Therapeutic Activity (39 Minutes): Therapeutic activity included Rolling, Supine to Sit, Scooting, Transfer Training, Ambulation on level ground, Sitting balance , Standing balance, and AAROM exercises to BLE's to improve functional Activity tolerance, Balance, Mobility, and Strength. TREATMENT GRID:  N/A    AFTER TREATMENT PRECAUTIONS: Alarm Activated, Bed/Chair Locked, Call light within reach, Chair, Needs within reach, and RN notified    INTERDISCIPLINARY COLLABORATION:  RN/ PCT and PT/ PTA    EDUCATION:      TIME IN/OUT:  Time In: 1011  Time Out: Olga 38  Minutes: [address]  BETTY Ellsworth

## 2022-12-08 NOTE — CARE COORDINATION
Patient is medically ready for discharge per attending. As neither [facility] IR or [address] feel patient is appropriate for [facility] level of care, patient will discharge to [facility]. Patient will transport via [address] transport at 1500; reference number [id number]. Room number is 326A and primary RN can call report to [phone]. Son update over phone and is agreeable to discharge plan. Facility liaison, Doreen Timmons will contact son, who is traveling out of state, to review necessary admission paperwork. 12/08/22 1216   Discharge Planning   Patient expects to be discharged to: [facility] Discharge   Transition of Care Consult (CM Consult) SNF   Partner SNF Yes   Services At/After Discharge [facility] (SNF)   Mode of Transport at Discharge [address] Time of Discharge 1500   Confirm Follow Up Transport Family   Condition of Participation: Discharge Planning   The Plan for Transition of Care is related to the following treatment goals: Patient will participate in STR therapies at SNF in order to return to safe baseline independence in ADLS. The Patient and/or Patient Representative was provided with a Choice of Provider? Patient Representative   Name of the Patient Representative who was provided with the Choice of Provider and agrees with the Discharge Plan? [facility]   Freedom of Choice list was provided with basic dialogue that supports the patient's individualized plan of care/goals, treatment preferences, and shares the quality data associated with the providers?   Yes

## 2022-12-08 NOTE — CARE COORDINATION
[facility] medical staff have reviewed patient's therapy progress and do not feel that patient would benefit or could tolerate from therapy at [facility] level of care. They feel patient would be benefit from SNF level of care for STR. Patient has bed offers from [facility]. CM will need to contact patient's family to confirm if they would like to accept bed offer from either facility. If bed available and patient is medically ready, CM anticipates that patient could discharge 12/8/2022. Last Covid testing was negative on 12/5. New rapid testing ordered to ensure result is within 72 hours of admission to facility.

## 2022-12-08 NOTE — CARE COORDINATION
CM assessment completed; see below. Current therapy recommendations are for STR at SNF. Patient's family would like for patient to discharge to [facility] level of care at [facility] if possible. Referral sent to admissions team who would like to follow progression of her therapy needs. CM will also send referrals to [address], [address] and [address] to check bed availability. Patient will not need insurance authorization prior to discharge. PPD is completed and covid testing was last negative on 12/4. New Covid testing may need to be ordered to remain within 72 hours of admission to accepting SNF. 12/05/22 2012   Service Assessment   Patient Orientation Alert and Oriented   Cognition Alert   History Provided By Patient; Child/Family;Medical Record   Primary Caregiver Self   Accompanied By/Relationship N/A   Support Systems Children   Patient's Healthcare Decision Maker is: Legal Next of Kin   PCP Verified by CM Yes   Can patient return to prior living arrangement No   Ability to make needs known: Fair   Family able to assist with home care needs: No   Would you like for me to discuss the discharge plan with any other family members/significant others, and if so, who? No   Financial Resources Medicare   Discharge [address] [facility]   DME Ordered? No   Potential Assistance Purchasing Medications No   Patient expects to be discharged to: Skilled nursing facility   History of falls? 1   Services At/After Discharge   Transition of Care Consult (CM Consult) SNF   Services At/After Discharge [facility] (SNF)   Mode of Transport at Discharge BLS   Confirm Follow Up Transport Family   Condition of Participation: Discharge Planning   The Patient and/or Patient Representative was provided with a Choice of Provider?  Patient Representative   Name of the Patient Representative who was provided with the Choice of Provider and agrees with the Discharge Plan? Wong Maurice   The Patient and/Or Patient Representative agree with the Discharge Plan? Yes   Freedom of Choice list was provided with basic dialogue that supports the patient's individualized plan of care/goals, treatment preferences, and shares the quality data associated with the providers?   Yes

## 2022-12-08 NOTE — DISCHARGE SUMMARY
Hospitalist Discharge Summary   Admit Date:  2022 10:00 PM   DC Note date: 2022  Name:  Tammy Borges   Age:  80 y.o. Sex:  female  :  3/11/1926   MRN:  [id number]   Room:  [facility]  PCP:  Donald Gan MD    Presenting Complaint: No chief complaint on file. Initial Admission Diagnosis: Closed right hip fracture, initial encounter ([facility] [S72.001A]     Problem List for this Hospitalization (present on admission):    Principal Problem (Resolved):    Closed right hip fracture, initial encounter [facility])  Active Problems:    Anxiety    Hypothyroidism    HTN (hypertension)    CHF (congestive heart failure) ([facility]    Pacemaker    Pulmonary hypertension ([facility]  Resolved Problems:    Palpitations      Hospital Course:  Patient is a 81 y/o female with medical history of chronic CHF (EF 62% in ), SSS s/p PPM, pAF not on [address], hypothyroidism, pulmonary hypertension who presented to ED from assisted living facility s/p mechanical fall with right hip pain. In ED, Hgb 9.0, CR 1.02. UA unremarkable. CT head and CXR no acute findings. XR hip shows acute, comminuted, displaced right hip intertrochanteric fracture. Patient was given morphine, Zofran in ED. Patient was transferred from [facility] to [facility] for further intervention. Patient underwent R intertrochanteric proximal femur fracture  with Dr. Ghassan Samson, Orthopedic Surgery. PT/OT recommendations for rehab on discharge. Placement has been obtained. Patient is hemodynamically stable to discharge to rehab facility today. Pain currently controlled with Tylenol. Wound care consultation given continue BLE scars and skin changes consistent with chronic venous stasis ulcers. Patient did not want aggressive cleaning. The nurse cleaned with Hibiclens and applied Aquaphor lotion. Recommendations to continue at discharge. In regards to patient's pAF, she was taken off Eliquis approx. 4-5 weeks ago per patient and confirmed with son.  This was done by the hospice team with family approval. She was placed on asa 81 mg po daily. Family has notified her Cardiologist. We will continue ASA 81 mg po BID for 30 days per Ortho. Can resume daily dosing thereafter. Outpatient follow-up with PCP and Orthopedics. Discussed discharge plan with patient, son by phone, care team, Dr. Ruben Griffin. All questions answered. Patient was stable at time of discharge. Instructions given to call a physician or return if any concerns. Disposition: Rehab Facility  Diet: ADULT ORAL NUTRITION SUPPLEMENT; AM Snack, PM Snack; Standard High Calorie/High Protein Oral Supplement  ADULT DIET; Regular; Low Fat/Low Chol/High Fiber/2 gm Na  Code Status: DNR    Follow Ups:   Follow-up Information     Pato Morse MD Follow up in 1 week(s). Specialty: Family Medicine  Contact information:  [address] [place] [address] Wing Harper MD Follow up. Specialty: Orthopedic Surgery  Why: Surgical follow-up  Contact information:  [address]  [phone]                       Time spent in patient discharge and coordination 35 minutes. Follow up labs/diagnostics: CBC / BMP 1 week    Current Discharge Medication List        START taking these medications    Details   bisacodyl (DULCOLAX) 5 MG EC tablet Take 1 tablet by mouth daily for 7 days  Qty: 7 tablet, Refills: 0      mineral oil-hydrophilic petrolatum (AQUAPHOR) ointment Apply topically daily Apply topically daily to bilateral lower legs  Qty: 50 g, Refills: 0           CONTINUE these medications which have CHANGED    Details   aspirin 81 MG EC tablet Take 1 tablet by mouth 2 times daily  Qty: 60 tablet, Refills: 0      acetaminophen (TYLENOL) 500 MG tablet Take 2 tablets by mouth every 8 hours as needed for Pain  Qty: 42 tablet, Refills: 0      temazepam (RESTORIL) 15 MG capsule Take 1 capsule by mouth nightly as needed for Sleep for up to 3 days.  1 po qhs  Qty: 3 capsule, Refills: 0    Associated Diagnoses: Other insomnia           CONTINUE these medications which have NOT CHANGED    Details   hydroCHLOROthiazide (HYDRODIURIL) 25 MG tablet       ondansetron (ZOFRAN) 4 MG tablet       potassium chloride (KLOR-CON M) 10 MEQ extended release tablet       triamcinolone (KENALOG) 0.025 % cream       metoprolol tartrate (LOPRESSOR) 50 MG tablet TAKE 1 TABLET BY MOUTH TWO TIMES A DAY. Qty: 180 tablet, Refills: 1    Associated Diagnoses: Bradycardia, unspecified      levothyroxine (SYNTHROID) 88 MCG tablet TAKE 1 TABLET BY MOUTH ONCE DAILY           STOP taking these medications       traMADol (ULTRAM) 50 MG tablet Comments:   Reason for Stopping:         ELIQUIS 2.5 MG TABS tablet Comments:   Reason for Stopping:         senna (SENOKOT) 8.6 MG TABS tablet Comments:   Reason for Stopping:         torsemide (DEMADEX) 20 MG tablet Comments:   Reason for Stopping:               Procedures done this admission:  Procedure(s):  OPEN REDUCTION INTERNAL FIXATION RIGHT HIP FRACTURE WITH TROCHANTERIC FIXATION  NAIL    Consults this admission:  IP WOUND CARE NURSE CONSULT TO EVAL  IP CONSULT TO PHYSICAL THERAPY  IP CONSULT TO CASE MANAGEMENT  IP CONSULT TO HOSPITALIST  IP WOUND CARE NURSE CONSULT TO EVAL    Echocardiogram results:  No results found for this or any previous visit. Diagnostic Imaging/Tests:   XR FEMUR RIGHT (MIN 2 VIEWS)    Result Date: 12/5/2022  Status post ORIF of the right femur without obvious complication. Total fluoroscopy time: 50 seconds; six fluoroscopic spot images saved. CT Head W/O Contrast    Result Date: 12/4/2022  No acute intracranial abnormality. 1     XR CHEST PORTABLE    Result Date: 12/4/2022  No radiographic evidence of acute cardiopulmonary disease. XR HIP 2-3 VW W PELVIS RIGHT    Result Date: 12/4/2022  Acute, comminuted, displaced right hip intertrochanteric fracture.        Labs: Results:       BMP, Mg, Phos Recent Labs     12/07/22  0543      K 3.7      CO2 30 ANIONGAP 1*   BUN 31*   CREATININE 1.00   LABGLOM 52*   CALCIUM 8.4   GLUCOSE 96      CBC Recent Labs     12/06/22  0502 12/07/22  0543 12/08/22  0444   WBC  --  8.4  --    RBC  --  2.73*  --    HGB 7.9* 7.8* 7.9*   HCT 24.2* 24.5* 24.5*   MCV  --  89.7  --    MCH  --  28.6  --    MCHC  --  31.8  --    RDW  --  15.6*  --    PLT  --  233  --    MPV  --  9.1*  --    NRBC  --  0.00  --    SEGS  --  66  --    LYMPHOPCT  --  23  --    EOSRELPCT  --  1  --    MONOPCT  --  9  --    BASOPCT  --  0  --    IMMGRAN  --  1  --    SEGSABS  --  5.6  --    LYMPHSABS  --  1.9  --    EOSABS  --  0.1  --    MONOSABS  --  0.8  --    BASOSABS  --  0.0  --    ABSIMMGRAN  --  0.1  --       LFT No results for input(s): BILITOT, BILIDIR, ALKPHOS, AST, ALT, PROT, LABALBU, GLOB in the last 72 hours. Cardiac  Lab Results   Component Value Date/Time    TROPHS 15.5 12/04/2022 06:44 AM      Coags Lab Results   Component Value Date/Time    PROTIME 14.9 12/04/2022 06:44 AM    INR 1.2 12/04/2022 06:44 AM      A1c No results found for: LABA1C, EAG   Lipids No results found for: CHOL, LDLCALC, LABVLDL, HDL, CHOLHDLRATIO, TRIG   Thyroid  No results found for: Lea Rome     Most Recent UA Lab Results   Component Value Date/Time    COLORU YELLOW/STRAW 12/04/2022 07:02 AM    APPEARANCE CLEAR 12/04/2022 07:02 AM    SPECGRAV 1.017 12/04/2022 07:02 AM    LABPH 7.5 12/04/2022 07:02 AM    PROTEINU Negative 12/04/2022 07:02 AM    GLUCOSEU Negative 12/04/2022 07:02 AM    KETUA Negative 12/04/2022 07:02 AM    BILIRUBINUR Negative 12/04/2022 07:02 AM    BLOODU Negative 12/04/2022 07:02 AM    UROBILINOGEN 0.2 12/04/2022 07:02 AM    NITRU Negative 12/04/2022 07:02 AM    LEUKOCYTESUR Negative 12/04/2022 07:02 AM        No results for input(s): CULTURE in the last 720 hours.     All Labs from Last 24 Hrs:  Recent Results (from the past 24 hour(s))   COVID-19, Rapid    Collection Time: 12/07/22  8:37 PM    Specimen: Nasopharyngeal   Result Value Ref Range Source NASAL      SARS-CoV-2, Rapid Not detected NOTD     Hemoglobin and Hematocrit    Collection Time: 12/08/22  4:44 AM   Result Value Ref Range    Hemoglobin 7.9 (L) 11.7 - 15.4 g/dL    Hematocrit 24.5 (L) 35.8 - 46.3 %       Allergies   Allergen Reactions    Meperidine Nausea And Vomiting    Oxycodone Itching and Nausea And Vomiting     Pt family does not want this as an option for pain management. Sulfa Antibiotics Rash    Propoxyphene Other (See Comments)     Unknown reaction    Amlodipine Rash    Codeine Nausea And Vomiting    Penicillins Rash     Immunization History   Administered Date(s) Administered    Influenza, High Dose (Fluzone 65 yrs and older) 10/28/2015, 11/01/2016, 10/31/2017, 10/12/2018    PPD Test 12/04/2022    Pneumococcal Conjugate 13-valent (Vena Koyanagi) 06/05/2017       Recent Vital Data:  Patient Vitals for the past 24 hrs:   Temp Pulse Resp BP SpO2   12/08/22 0700 97.7 °F (36.5 °C) 82 18 115/76 96 %   12/08/22 0354 97.5 °F (36.4 °C) 82 18 (!) 140/72 97 %   12/07/22 2338 97.3 °F (36.3 °C) 79 18 118/67 97 %   12/07/22 1928 97.9 °F (36.6 °C) 81 18 (!) 113/58 99 %   12/07/22 1516 98.1 °F (36.7 °C) 82 18 105/61 99 %       Oxygen Therapy  SpO2: 96 %  Pulse Oximeter Device Mode: Intermittent  Pulse Oximeter Device Location: Right, Finger  O2 Device: Nasal cannula  O2 Flow Rate (L/min): 3 L/min    Estimated body mass index is 20.6 kg/m² as calculated from the following:    Height as of this encounter: 4' 11\" (1.499 m). Weight as of this encounter: 102 lb (46.3 kg). Intake/Output Summary (Last 24 hours) at 12/8/2022 1055  Last data filed at 12/8/2022 0356  Gross per 24 hour   Intake --   Output 1750 ml   Net -1750 ml         Physical Exam:  General:          Alert, frail, no acute distress, oriented, calm  Head:               Normocephalic, atraumatic  Eyes:               Sclerae appear normal.  Pupils equally round. Glasses intact  ENT:                Nares appear normal, no drainage. Moist oral mucosa  Neck:               No restricted ROM. Trachea midline   CV:                  RRR.  PPM intact to left chest. No JVD. Lungs:             CTAB. No wheezing, rhonchi. Resp even and unlabored on RA  Abdomen: Bowel sounds present. Soft, nontender, nondistended. Extremities:     No cyanosis or clubbing. RLE surgical dressing C/D/I, mild swelling distal through thigh. Skin:                No rashes and normal coloration. Warm and dry. BLE wrapped per wound care, sensation and pulses intact to bilateral feet  Neuro:             CN II-XII grossly intact. Sensation intact. A&Ox3. No focal deficits  Psych:             Normal mood and affect.          Signed:  Pepe Diaz AGAP-BC

## 2022-12-08 NOTE — PROGRESS NOTES
ACUTE PHYSICAL THERAPY GOALS:   (Developed with and agreed upon by patient and/or caregiver.)     (1.) Temi Nunez  will move from supine to sit and sit to supine  with MINIMAL ASSIST within 7 treatment day(s). (2.) Temi Nunez will transfer from bed to chair and chair to bed with MINIMAL ASSIST using the least restrictive device within 7 treatment day(s). (3.) Temi Nunez will ambulate with MINIMAL ASSIST for 30 feet with the least restrictive device within 7 treatment day(s). (4.) Temi Nunez will perform standing static and dynamic balance activities x 10 minutes with MINIMAL ASSIST to improve safety within 7 treatment day(s). (5.) Temi Nunez will perform bilateral lower extremity exercises x 20 min for HEP with INDEPENDENCE to improve strength, endurance, and functional mobility within 7 treatment day(s). PHYSICAL THERAPY: Daily Note PM   (Link to Caseload Tracking: PT Visit Days : 3  Time In/Out PT Charge Capture  Rehab Caseload Tracker  Orders  RLE WBAT    Temi Nunez is a 80 y.o. female   PRIMARY DIAGNOSIS: Closed right hip fracture, initial encounter ([facility] [address]  Closed right hip fracture, initial encounter ([facility] [address] [S72.001A]  Procedure(s) (LRB):  OPEN REDUCTION INTERNAL FIXATION RIGHT HIP FRACTURE WITH TROCHANTERIC FIXATION  NAIL (Right)  3 Days Post-Op  Inpatient: Payor: MEDICARE / Plan: MEDICARE PART A AND B / Product Type: *No Product type* /     ASSESSMENT:     REHAB RECOMMENDATIONS:   Recommendation to date pending progress:  Setting:  Short-term Rehab    Equipment:    To Be Determined     ASSESSMENT:  Ms. Karen Barrera was sitting up in recliner chair upon contact and agreeable to PT. Patient reports feeling better this afternoon. Patient requested to use BSC. Patient transferred to standing with max assist and cues for hand placement. Once standing patient demonstrated poor balance, posture, and walker utilization requiring cues to improve.  Patient unable to perform gait training or take steps to [facility] ultimately requiring max assist to perform SPT to [facility] then to EOB with same assist.  Patient returned to supine with max assist. Overall slow progress. Will continue PT efforts.      SUBJECTIVE:   Ms. Tanya Horn states, \"I feel better\"     Social/Functional Lives With: Alone  Type of Home: Assisted living  Home Equipment: [employer], [address] Help From: Other (comment), Family  ADL Assistance: Needs assistance  Bath:  (needs assist with sponge bathing due to [employer] wounds\)  Homemaking Assistance: Independent (basic cooking & housekeeping)  Ambulation Assistance: Needs assistance (cane or rollator)  OBJECTIVE:     PAIN: Jacqueline Orellanamer / O2: Minnie Marline / Haily Mineral / DRAINS:   Pre Treatment:   Pain Assessment: None - Denies Pain      Post Treatment: 0 Vitals        Oxygen    None    RESTRICTIONS/PRECAUTIONS:  Restrictions/Precautions  Restrictions/Precautions: Fall Risk  Restrictions/Precautions: Fall Risk     MOBILITY: I Mod I S SBA CGA Min Mod Max Total  NT x2 Comments:   Bed Mobility    Rolling [] [] [] [] [] [] [] [] [] [] []    Supine to Sit [] [] [] [] [] [] [] [] [] [] []    Scooting [] [] [] [] [] [] [] [] [] [] []    Sit to Supine [] [] [] [] [] [] [] [x] [] [] []    Transfers    Sit to Stand [] [] [] [] [] [] [] [x] [] [] []    Bed to Chair [] [] [] [] [] [] [] [x] [] [] []    Stand to Sit [] [] [] [] [] [] [] [x] [] [] []     [] [] [] [] [] [] [] [] [] [] []    I=Independent, Mod I=Modified Independent, S=Supervision, SBA=Standby Assistance, CGA=Contact Guard Assistance,   Min=Minimal Assistance, Mod=Moderate Assistance, Max=Maximal Assistance, Total=Total Assistance, NT=Not Tested    BALANCE: Good Fair+ Fair Fair- Poor NT Comments   Sitting Static [] [] [x] [] [] []    Sitting Dynamic [] [] [] [x] [] []              Standing Static [] [] [] [] [x] []    Standing Dynamic [] [] [] [] [x] []      GAIT: I Mod I S SBA CGA Min Mod Max Total  NT x2 Comments:   Level of Assistance [] [] [] [] [] [] [] [x] [] [] []    Distance SPT - unable to take steps    DME Gait Belt and Rolling Walker    Gait Quality Antalgic, Decreased adin , Decreased step clearance, Decreased step length, Decreased stance, Trunk flexion, and R knee buckling    Weightbearing Status      Stairs      I=Independent, Mod I=Modified Independent, S=Supervision, SBA=Standby Assistance, CGA=Contact Guard Assistance,   Min=Minimal Assistance, Mod=Moderate Assistance, Max=Maximal Assistance, Total=Total Assistance, NT=Not Tested    PLAN:   FREQUENCY AND DURATION: BID for duration of hospital stay or until stated goals are met, whichever comes first.    TREATMENT:   TREATMENT:   Therapeutic Activity (24 Minutes): Therapeutic activity included Sit to Supine, Scooting, Transfer Training, Ambulation on level ground, Sitting balance , Standing balance, and AAROM exercises to BLE's to improve functional Activity tolerance, Balance, Mobility, and Strength. TREATMENT GRID:  N/A    AFTER TREATMENT PRECAUTIONS: Alarm Activated, Bed, Bed/Chair Locked, Call light within reach, Needs within reach, RN notified, and Side rails x3    INTERDISCIPLINARY COLLABORATION:  RN/ PCT and PT/ PTA    EDUCATION:      TIME IN/OUT:  Time In: [address]  Time Out: [address]  Minutes: Chente Molina [address] PTA

## 2022-12-09 ENCOUNTER — CARE COORDINATION (OUTPATIENT)
Dept: OTHER | Facility: CLINIC | Age: 87
End: 2022-12-09

## 2022-12-09 LAB
ABO + RH BLD: NORMAL
BLD PROD TYP BPU: NORMAL
BLD PROD TYP BPU: NORMAL
BLOOD BANK DISPENSE STATUS: NORMAL
BLOOD BANK DISPENSE STATUS: NORMAL
BLOOD GROUP ANTIBODIES SERPL: NORMAL
BPU ID: NORMAL
BPU ID: NORMAL
CROSSMATCH RESULT: NORMAL
CROSSMATCH RESULT: NORMAL
SPECIMEN EXP DATE BLD: NORMAL
UNIT DIVISION: 0
UNIT DIVISION: 0

## 2022-12-09 NOTE — CARE COORDINATION
Transition of care outreach postponed for 14 days due to patient's [facility]

## 2022-12-12 ENCOUNTER — CARE COORDINATION (OUTPATIENT)
Dept: CARE COORDINATION | Facility: CLINIC | Age: 87
End: 2022-12-12

## 2022-12-12 NOTE — CARE COORDINATION
Chart reviewed. Notified [facility] IDT of admission for STR. Will follow weekly progress.

## 2022-12-15 ENCOUNTER — CARE COORDINATION (OUTPATIENT)
Dept: CARE COORDINATION | Facility: CLINIC | Age: 87
End: 2022-12-15

## 2022-12-15 NOTE — CARE COORDINATION
Post Acute Facility Update     *The information contained in this note was received during a weekly care coordination call with the post acute facility*    [facility]  Anticipated Discharge Date: TBD    Facility Nursing Update:   V/S stable. CBC W/DIFFERENTIAL  Once - One Time        CMP COMP METABOLIC  Once  MAGNESIUM  Once - One Time T3, TOTAL  Once - One Time   T4, FREE  Once - TSH  Once - One Time  12/11/22 acetaminophen [OTC]  tablet; 500 mg; amt: 2 tablets; oral  Twice A Day - PRN bisacodyl [OTC]  tablet,delayed release (DR/EC); 5 mg; amt: one tablet; oral  Special Instructions: **MAY HOLD FOR LOOSE STOOLS**  Once A Day  tramadol - Schedule IV  tablet; 50 mg; amt: 1; oral  Special Instructions: TAKE ONE BY MOUTH EVERY 12 HOURS  Every 12 Hours acetaminophen [OTC]  tablet; 500 mg; amt: 2; oral  Special Instructions: TAKE TWO BY MOUTH AT 3:00 PM  ferrous sulfate  tablet; 325 mg (65 mg iron); amt: on tablet; oral  Once A Day Every Other Day  Vitamin C (ascorbic acid (vitamin c))  tablet; 500 mg; amt: one tablet; oral  Once A Day Every Other Day  Facility Social Work Update: [facility]  Bundle Program Status: none  Upper Extremity Assistance: Contact Guard Assist - hands on patient for balance   Lower Extremity Assistance: Moderate Assistance   Bed Mobility: Moderate Assistance   Transfers: Moderate Assistance   Ambulation: Maximum Assistance  How far (in feet) is the patient ambulating?  15  Device Used: RW  Barriers to Discharge: TBD  Other: n/a

## 2022-12-20 DIAGNOSIS — R00.1 BRADYCARDIA: ICD-10-CM

## 2022-12-20 DIAGNOSIS — Z95.0 PACEMAKER: ICD-10-CM

## 2022-12-21 ENCOUNTER — CARE COORDINATION (OUTPATIENT)
Dept: CARE COORDINATION | Facility: CLINIC | Age: 87
End: 2022-12-21

## 2022-12-21 NOTE — CARE COORDINATION
Post Acute Facility Update     *The information contained in this note was received during a weekly care coordination call with the post acute facility*    [facility]  Anticipated Discharge Date: TBD    Facility Nursing Update: V/S remain stable, working on PM  12/16/22 hydrochlorothiazide  tablet; 25 mg; amt: 0.5 tablet; oral  Once A Day  12/19/22 acetaminophen [OTC]  tablet; 325 mg; amt: 2 tablets (650mg); oral  Three Times A Day, BP issues, BP dropping, NP made changes to meds. ferrous sulfate  tablet; 325 mg (65 mg iron); amt: on tablet; oral  Special Instructions: daily  Once A Day   metoprolol tartrate  tablet; 25 mg; amt: one tablet; oral  Twice A Day  tramadol - Schedule IV  tablet; 50 mg; amt: 0.5 tablet (25mg); oral  Twice A Day  12/20/22 temazepam - Schedule IV  capsule; 15 mg; amt: 0.5 tablet (7.5mg); oral  Special Instructions: DX INSOMNIA  At Bedtime - PRN  30/52/08 BASIC METABOLIC PANEL  Once - One Time  CBC W/DIFFERENTIAL  Once - One Time  HEMATOCRIT  Once - One Time  HEMOGLOBIN  Once - One Time      Facility Social Work Update: [facility] at discharge  Bundle Program Status: none  Upper Extremity Assistance: Minimal Assistance   Lower Extremity Assistance: Mod/Max Assistance  Bed Mobility: Mod/Max Assistance  Transfers: Moderate Assistance   Ambulation: Moderate Assistance   How far (in feet) is the patient ambulating?  Transfers only 2-4 steps  Device Used: RW  Barriers to Discharge: Age, femur fx, slow progress  Other: NATAILIA to evaluate patient for return

## 2022-12-29 ENCOUNTER — CARE COORDINATION (OUTPATIENT)
Dept: CARE COORDINATION | Facility: CLINIC | Age: 87
End: 2022-12-29

## 2022-12-29 NOTE — CARE COORDINATION
Post Acute Facility Update     *The information contained in this note was received during a weekly care coordination call with the post acute facility*    Current Facility: [facility] ([facility])  Anticipated Discharge Date: 1/4/22    Facility Nursing Update:    V/S stable. UA REFLEX TO CULTURE  Once - One Time  00:00 AMUA REFLEX TO CULTURE  Once - One Time  00:00 AMUA REFLEX TO CULTURE  Once - One Time 12/24/2022 Open Ended     12/24/22      Myrbetriq (mirabegron)  tablet extended release 24 hr; 50 mg; amt: e  12/24/22   UA REFLEX TO CULTURE  Once - One Time  00:00 AM   12/27/22 HIP UNI W OR W/O PELVIS 2-3 V-RT  Once - One Time  05:00 PM  Facility Social Work Update: [facility]  Bundle Program Status: none  Upper Extremity Assistance: Min/Mod Assistance  Lower Extremity Assistance: Maximum Assistance  Bed Mobility: Minimal Assistance   Transfers: Moderate Assistance   Ambulation: Minimal Assistance   How far (in feet) is the patient ambulating?  5-10ft  Device Used: RW  Barriers to Discharge: snf to evaluate patient next week for return to [facility]  Other: TBD

## 2023-01-04 ENCOUNTER — CARE COORDINATION (OUTPATIENT)
Dept: CARE COORDINATION | Facility: CLINIC | Age: 88
End: 2023-01-04

## 2023-01-04 NOTE — CARE COORDINATION
Post Acute Facility Update     *The information contained in this note was received during a weekly care coordination call with the post acute facility*    Current Facility: [facility] ([facility])  Anticipated Discharge Date: 1/12/23    Facility Nursing Update:   PATIENT COVID POSITIVE AS OF 1/1/23  V/S stable    1/2/23 Pyridium (phenazopyridine)  tablet; 200 mg; amt: ONE TABLET; oral  Three Times A Day - PRN  Facility Social Work Update: [facility]  Bundle Program Status: none  Upper Extremity Assistance: Minimal Assistance   Lower Extremity Assistance: Mod/Max Assistance  Bed Mobility: Minimal Assistance   Transfers: Min/Mod Assistance  Ambulation: Minimal Assistance   How far (in feet) is the patient ambulating?  10  Device Used: RW  Barriers to Discharge: n/a  Other: n/a

## 2023-01-11 ENCOUNTER — CARE COORDINATION (OUTPATIENT)
Dept: CARE COORDINATION | Facility: CLINIC | Age: 88
End: 2023-01-11

## 2023-01-11 NOTE — CARE COORDINATION
Post Acute Facility Update     *The information contained in this note was received during a weekly care coordination call with the post acute facility*    [facility]  Anticipated Discharge Date: 1/16/23    Facility Nursing Update: V/S stable  ADULT ORAL NUTRITION SUPPLEMENT; AM Snack, PM Snack; Standard High Calorie/High Protein Oral Supplement  ADULT DIET; Regular; Low Fat/Low Chol/High Fiber/2 gm Na  1/6/23 temazepam - Schedule IV  capsule; 7.5 mg; amt: 1 CAPSULE; oral. PRN at bedtime for insomnia. 1/6/23 SKIN TREATMENT  Special Instructions: [address] LEGS WITH HIBCLENS TWICE DAILY AND LET DRY. .DO NOT COVER. .. Twice A Day 1/25/23 PACEMAKER CHECKS  Special Instructions: PACEMAKER CHECK WITH [facility] OFFICE CHECK  Once - One Time  Facility Social Work Update: [facility] [employer] [facility]  Bundle Program Status: none  Upper Extremity Assistance: Stand by Assist - Presence and Cueing  Lower Extremity Assistance: Maximum Assistance  Bed Mobility: Minimal Assistance   Transfers: Minimal Assistance   Ambulation: Minimal Assistance   How far (in feet) is the patient ambulating?  20-30  Device Used: RW  Barriers to Discharge:COVID  Other:  EXTENDED TX DUE TO COVID, POOR INTAKE, INCREASED NAUSEA

## 2023-01-18 ENCOUNTER — CARE COORDINATION (OUTPATIENT)
Dept: CARE COORDINATION | Facility: CLINIC | Age: 88
End: 2023-01-18

## 2023-01-18 NOTE — CARE COORDINATION
Post Acute Facility Update     *The information contained in this note was received during a weekly care coordination call with the post acute facility*    [facility]  Anticipated Discharge Date: 1/16/23 [facility] [place] [facility] Nursing Update: Stable at discharge. Facility Social Work Update: [facility] [address] [place] [address] Program Status: none  Upper Extremity Assistance: Stand by Assist - Presence and Cueing  Lower Extremity Assistance: Mod/Max Assistance  Bed Mobility: Contact Guard Assist - hands on patient for balance   Transfers: Contact Guard Assist - hands on patient for balance   Ambulation: Contact Guard Assist - hands on patient for balance   How far (in feet) is the patient ambulating?  50  Device Used: RW  Barriers to Discharge: n/a  Other: n/a

## 2023-03-08 DIAGNOSIS — R00.1 BRADYCARDIA: ICD-10-CM

## 2023-03-08 DIAGNOSIS — Z95.0 PACEMAKER: ICD-10-CM

## 2023-08-03 DIAGNOSIS — Z95.0 PACEMAKER: ICD-10-CM

## 2023-08-03 DIAGNOSIS — R00.1 BRADYCARDIA: ICD-10-CM

## 2023-08-07 NOTE — ED NOTES
Patient was present in the emergency department during an electronic medical record downtime event, therefore not all elements of charting may have been completed in the electronic medical record. Please reference paper charting for this visit to ensure complete viewing of the medical record for this specific visit. Negative

## 2023-08-08 PROCEDURE — 93294 REM INTERROG EVL PM/LDLS PM: CPT | Performed by: INTERNAL MEDICINE

## 2023-08-08 PROCEDURE — 93296 REM INTERROG EVL PM/IDS: CPT | Performed by: INTERNAL MEDICINE

## 2023-09-21 NOTE — CARE COORDINATION
[facility] communicates this AM that their physician would like to review therapy progress today in order to determine if patient will be able to tolerate 3 hours of IRC level of therapy. If patient is not accepted, patient does have bed offers from [facility], [address] [place] [address] and [facility]. CM will need to review these options with patient's son for first choice. 48 hour PPD should be read today and patient could discharge when bed available and medically ready. Anesthesia Type: 1% lidocaine with 1:200,000 epinephrine and a 1:10 solution of 8.4% sodium bicarbonate

## (undated) DEVICE — SOLUTION IV 1000ML 0.9% SOD CHL

## (undated) DEVICE — BIT DRL TWST 1.8X90MM DISP --

## (undated) DEVICE — Device

## (undated) DEVICE — BUTTON SWITCH PENCIL BLADE ELECTRODE, HOLSTER: Brand: EDGE

## (undated) DEVICE — WIRE K 1.1X100MM --

## (undated) DEVICE — PAD,ABDOMINAL,5"X9",ST,LF,25/BX: Brand: MEDLINE INDUSTRIES, INC.

## (undated) DEVICE — DISPOSABLE TOURNIQUET CUFF SINGLE BLADDER, DUAL PORT AND QUICK CONNECT CONNECTOR: Brand: COLOR CUFF

## (undated) DEVICE — ROD RMR L950MM DIA2.5MM W/ EXTN BALL TIP

## (undated) DEVICE — 2000CC GUARDIAN II: Brand: GUARDIAN

## (undated) DEVICE — SUTURE PDS II SZ 3-0 L27IN ABSRB VLT L26MM SH 1/2 CIR Z316H

## (undated) DEVICE — SUTURE VCRL SZ 1 L27IN ABSRB UD L36MM CP-1 1/2 CIR REV CUT J268H

## (undated) DEVICE — GLOVE ORANGE PI 8   MSG9080

## (undated) DEVICE — 3M™ TEGADERM™ TRANSPARENT FILM DRESSING FRAME STYLE, 1628, 6 IN X 8 IN (15 CM X 20 CM), 10/CT 8CT/CASE: Brand: 3M™ TEGADERM™

## (undated) DEVICE — (D)STRIP SKN CLSR 0.5X4IN WHT --

## (undated) DEVICE — BIT DRL L145MM DIA4.2MM NONSTERILE 3 FLUT NDL PNT QUIK CPL

## (undated) DEVICE — SUT ETHLN 4-0 18IN PS2 BLK --

## (undated) DEVICE — SPLINT ORTH W3XL12IN RL FRM WRINKLE FREE INTLOK PERFRMANCE

## (undated) DEVICE — PEG WRST THRD VOLAR 16MM --
Type: IMPLANTABLE DEVICE | Site: ARM | Status: NON-FUNCTIONAL
Removed: 2017-03-28

## (undated) DEVICE — GLOVE SURG SZ 8 L12IN FNGR THK79MIL GRN LTX FREE

## (undated) DEVICE — SOLUTION IRRIG 1000ML 09% SOD CHL USP PIC PLAS CONTAINER

## (undated) DEVICE — DRESSING,GAUZE,XEROFORM,CURAD,5"X9",ST: Brand: CURAD

## (undated) DEVICE — SURGICAL PROCEDURE PACK BASIC ST FRANCIS

## (undated) DEVICE — STERILE HOOK LOCK LATEX FREE ELASTIC BANDAGE 3INX5YD: Brand: HOOK LOCK™

## (undated) DEVICE — GUIDEWIRE ORTH L400MM DIA3.2MM FOR TFN

## (undated) DEVICE — (D)PREP SKN CHLRAPRP APPL 26ML -- CONVERT TO ITEM 371833

## (undated) DEVICE — GLOVE SURG SZ 85 L12IN FNGR THK79MIL GRN LTX FREE

## (undated) DEVICE — GLOVE ORANGE PI 7 1/2   MSG9075

## (undated) DEVICE — SUTURE ABSRB BRAID COAT VLT CP-1 2-0 27IN VCRL J466H

## (undated) DEVICE — MASTISOL ADHESIVE LIQ 2/3ML

## (undated) DEVICE — PADDING CAST COHESIVE 4 YDX3 IN HND TEARABLE COTTON SPEC 100

## (undated) DEVICE — SUTURE MCRYL SZ 4-0 L27IN ABSRB UD L19MM PS-2 1/2 CIR PRIM Y426H

## (undated) DEVICE — 1010 S-DRAPE TOWEL DRAPE 10/BX: Brand: STERI-DRAPE™

## (undated) DEVICE — SLING ARM SM 1-27INX6-11IN PCH --

## (undated) DEVICE — REM POLYHESIVE ADULT PATIENT RETURN ELECTRODE: Brand: VALLEYLAB

## (undated) DEVICE — DRAPE XR C ARM 41X74IN LF --

## (undated) DEVICE — DRESSING,GAUZE,XEROFORM,CURAD,1"X8",ST: Brand: CURAD

## (undated) DEVICE — C-ARM: Brand: UNBRANDED

## (undated) DEVICE — TFN: Brand: MEDLINE INDUSTRIES, INC.